# Patient Record
Sex: MALE | Race: WHITE | NOT HISPANIC OR LATINO | Employment: FULL TIME | URBAN - METROPOLITAN AREA
[De-identification: names, ages, dates, MRNs, and addresses within clinical notes are randomized per-mention and may not be internally consistent; named-entity substitution may affect disease eponyms.]

---

## 2023-04-13 ENCOUNTER — OFFICE VISIT (OUTPATIENT)
Dept: PRIMARY CARE | Facility: CLINIC | Age: 68
End: 2023-04-13
Payer: COMMERCIAL

## 2023-04-13 VITALS
HEIGHT: 67 IN | BODY MASS INDEX: 35 KG/M2 | TEMPERATURE: 98.4 F | HEART RATE: 70 BPM | WEIGHT: 223 LBS | DIASTOLIC BLOOD PRESSURE: 84 MMHG | RESPIRATION RATE: 14 BRPM | SYSTOLIC BLOOD PRESSURE: 122 MMHG

## 2023-04-13 DIAGNOSIS — E55.9 VITAMIN D DEFICIENCY, UNSPECIFIED: ICD-10-CM

## 2023-04-13 DIAGNOSIS — R35.1 NOCTURIA: ICD-10-CM

## 2023-04-13 DIAGNOSIS — E78.5 DYSLIPIDEMIA: ICD-10-CM

## 2023-04-13 DIAGNOSIS — I25.10 CORONARY ARTERY DISEASE DUE TO LIPID RICH PLAQUE: ICD-10-CM

## 2023-04-13 DIAGNOSIS — Z95.1 S/P CABG (CORONARY ARTERY BYPASS GRAFT): ICD-10-CM

## 2023-04-13 DIAGNOSIS — M10.9 ACUTE GOUT OF ANKLE, UNSPECIFIED CAUSE, UNSPECIFIED LATERALITY: ICD-10-CM

## 2023-04-13 DIAGNOSIS — I25.83 CORONARY ARTERY DISEASE DUE TO LIPID RICH PLAQUE: ICD-10-CM

## 2023-04-13 DIAGNOSIS — K21.9 GASTROESOPHAGEAL REFLUX DISEASE WITHOUT ESOPHAGITIS: ICD-10-CM

## 2023-04-13 DIAGNOSIS — I10 BENIGN ESSENTIAL HYPERTENSION: Primary | ICD-10-CM

## 2023-04-13 DIAGNOSIS — Z00.00 ROUTINE GENERAL MEDICAL EXAMINATION AT HEALTH CARE FACILITY: ICD-10-CM

## 2023-04-13 DIAGNOSIS — R73.9 HYPERGLYCEMIA: ICD-10-CM

## 2023-04-13 PROCEDURE — 85025 COMPLETE CBC W/AUTO DIFF WBC: CPT | Performed by: INTERNAL MEDICINE

## 2023-04-13 PROCEDURE — 1170F FXNL STATUS ASSESSED: CPT | Performed by: INTERNAL MEDICINE

## 2023-04-13 PROCEDURE — 99214 OFFICE O/P EST MOD 30 MIN: CPT | Performed by: INTERNAL MEDICINE

## 2023-04-13 PROCEDURE — 80061 LIPID PANEL: CPT | Performed by: INTERNAL MEDICINE

## 2023-04-13 PROCEDURE — 3079F DIAST BP 80-89 MM HG: CPT | Performed by: INTERNAL MEDICINE

## 2023-04-13 PROCEDURE — 3074F SYST BP LT 130 MM HG: CPT | Performed by: INTERNAL MEDICINE

## 2023-04-13 PROCEDURE — 1036F TOBACCO NON-USER: CPT | Performed by: INTERNAL MEDICINE

## 2023-04-13 PROCEDURE — 1159F MED LIST DOCD IN RCRD: CPT | Performed by: INTERNAL MEDICINE

## 2023-04-13 PROCEDURE — 1160F RVW MEDS BY RX/DR IN RCRD: CPT | Performed by: INTERNAL MEDICINE

## 2023-04-13 PROCEDURE — 84153 ASSAY OF PSA TOTAL: CPT | Performed by: INTERNAL MEDICINE

## 2023-04-13 PROCEDURE — 80048 BASIC METABOLIC PNL TOTAL CA: CPT | Performed by: INTERNAL MEDICINE

## 2023-04-13 PROCEDURE — 84450 TRANSFERASE (AST) (SGOT): CPT | Performed by: INTERNAL MEDICINE

## 2023-04-13 PROCEDURE — G0438 PPPS, INITIAL VISIT: HCPCS | Performed by: INTERNAL MEDICINE

## 2023-04-13 PROCEDURE — 83036 HEMOGLOBIN GLYCOSYLATED A1C: CPT | Performed by: INTERNAL MEDICINE

## 2023-04-13 RX ORDER — COLCHICINE 0.6 MG/1
1 TABLET ORAL 3 TIMES DAILY
COMMUNITY
Start: 2021-10-05 | End: 2023-04-13 | Stop reason: SDUPTHER

## 2023-04-13 RX ORDER — TRIAMTERENE AND HYDROCHLOROTHIAZIDE 37.5; 25 MG/1; MG/1
1 CAPSULE ORAL
COMMUNITY
Start: 2016-03-14 | End: 2023-04-13 | Stop reason: SDUPTHER

## 2023-04-13 RX ORDER — ALLOPURINOL 100 MG/1
1 TABLET ORAL DAILY
COMMUNITY
Start: 2020-10-10 | End: 2023-10-06

## 2023-04-13 RX ORDER — COLCHICINE 0.6 MG/1
0.6 TABLET ORAL DAILY
Qty: 30 TABLET | Refills: 2 | Status: SHIPPED | OUTPATIENT
Start: 2023-04-13 | End: 2023-05-11

## 2023-04-13 RX ORDER — METOPROLOL TARTRATE 50 MG/1
TABLET ORAL EVERY 12 HOURS
COMMUNITY
Start: 2019-02-28

## 2023-04-13 RX ORDER — TAMSULOSIN HYDROCHLORIDE 0.4 MG/1
1 CAPSULE ORAL DAILY
COMMUNITY
Start: 2019-02-28 | End: 2023-06-21

## 2023-04-13 RX ORDER — METOPROLOL SUCCINATE 50 MG/1
50 TABLET, EXTENDED RELEASE ORAL DAILY
COMMUNITY
End: 2023-11-27

## 2023-04-13 RX ORDER — FLUTICASONE PROPIONATE AND SALMETEROL 250; 50 UG/1; UG/1
POWDER RESPIRATORY (INHALATION) 2 TIMES DAILY
COMMUNITY
Start: 2019-02-28 | End: 2024-01-30 | Stop reason: ALTCHOICE

## 2023-04-13 RX ORDER — VENLAFAXINE HYDROCHLORIDE 37.5 MG/1
1 TABLET, EXTENDED RELEASE ORAL DAILY
COMMUNITY
End: 2024-01-30 | Stop reason: ALTCHOICE

## 2023-04-13 RX ORDER — TRIAMTERENE AND HYDROCHLOROTHIAZIDE 37.5; 25 MG/1; MG/1
1 CAPSULE ORAL
Qty: 90 CAPSULE | Refills: 3 | Status: SHIPPED | OUTPATIENT
Start: 2023-04-13 | End: 2024-01-04

## 2023-04-13 RX ORDER — ATORVASTATIN CALCIUM 40 MG/1
40 TABLET, FILM COATED ORAL NIGHTLY
COMMUNITY
End: 2024-01-30 | Stop reason: SDUPTHER

## 2023-04-13 ASSESSMENT — ACTIVITIES OF DAILY LIVING (ADL)
NEEDS ASSISTANCE WITH FOOD: INDEPENDENT
HEARING - LEFT EAR: FUNCTIONAL
TOILETING: INDEPENDENT
USING TELEPHONE: INDEPENDENT
PREPARING MEALS: INDEPENDENT
WALKS IN HOME: INDEPENDENT
BATHING: INDEPENDENT
GROOMING: INDEPENDENT
HEARING - RIGHT EAR: FUNCTIONAL
PILL BOX USED: NO
FEEDING YOURSELF: INDEPENDENT
TAKING MEDICATION: INDEPENDENT
GROCERY SHOPPING: INDEPENDENT
JUDGMENT_ADEQUATE_SAFELY_COMPLETE_DAILY_ACTIVITIES: YES
PATIENT'S MEMORY ADEQUATE TO SAFELY COMPLETE DAILY ACTIVITIES?: YES
EATING: INDEPENDENT
ADEQUATE_TO_COMPLETE_ADL: YES
STIL DRIVING: YES
DOING HOUSEWORK: INDEPENDENT
MANAGING FINANCES: INDEPENDENT
DRESSING YOURSELF: INDEPENDENT

## 2023-04-13 ASSESSMENT — ANXIETY QUESTIONNAIRES
1. FEELING NERVOUS, ANXIOUS, OR ON EDGE: NOT AT ALL
IF YOU CHECKED OFF ANY PROBLEMS ON THIS QUESTIONNAIRE, HOW DIFFICULT HAVE THESE PROBLEMS MADE IT FOR YOU TO DO YOUR WORK, TAKE CARE OF THINGS AT HOME, OR GET ALONG WITH OTHER PEOPLE: NOT DIFFICULT AT ALL
6. BECOMING EASILY ANNOYED OR IRRITABLE: NOT AT ALL
7. FEELING AFRAID AS IF SOMETHING AWFUL MIGHT HAPPEN: NOT AT ALL
4. TROUBLE RELAXING: NOT AT ALL
GAD7 TOTAL SCORE: 0
2. NOT BEING ABLE TO STOP OR CONTROL WORRYING: NOT AT ALL
5. BEING SO RESTLESS THAT IT IS HARD TO SIT STILL: NOT AT ALL
3. WORRYING TOO MUCH ABOUT DIFFERENT THINGS: NOT AT ALL

## 2023-04-13 ASSESSMENT — PAIN SCALES - GENERAL: PAINLEVEL: 4

## 2023-04-13 ASSESSMENT — ENCOUNTER SYMPTOMS
DEPRESSION: 0
LOSS OF SENSATION IN FEET: 0
OCCASIONAL FEELINGS OF UNSTEADINESS: 0

## 2023-04-13 ASSESSMENT — PATIENT HEALTH QUESTIONNAIRE - PHQ9
1. LITTLE INTEREST OR PLEASURE IN DOING THINGS: NOT AT ALL
SUM OF ALL RESPONSES TO PHQ9 QUESTIONS 1 AND 2: 0
2. FEELING DOWN, DEPRESSED OR HOPELESS: NOT AT ALL

## 2023-04-13 ASSESSMENT — COLUMBIA-SUICIDE SEVERITY RATING SCALE - C-SSRS
2. HAVE YOU ACTUALLY HAD ANY THOUGHTS OF KILLING YOURSELF?: NO
1. IN THE PAST MONTH, HAVE YOU WISHED YOU WERE DEAD OR WISHED YOU COULD GO TO SLEEP AND NOT WAKE UP?: NO
6. HAVE YOU EVER DONE ANYTHING, STARTED TO DO ANYTHING, OR PREPARED TO DO ANYTHING TO END YOUR LIFE?: NO

## 2023-04-13 NOTE — PROGRESS NOTES
"Subjective   Reason for Visit: Bipin Chatman is an 67 y.o. male here for a Medicare Wellness visit.          Reviewed all medications by prescribing practitioner or clinical pharmacist (such as prescriptions, OTCs, herbal therapies and supplements) and documented in the medical record.    HPI    Patient Care Team:  Christiano Lazo MD as PCP - General  Christiano Lazo MD as PCP - [employer] PCP   Patient is a 67-year-old male here for Medicare wellness he lives home with his wife he is self-sufficient still works part-time he denies any history of falling he denies fever chills cough nausea vomiting.    Review of Systems  10 system reviewed pertinent as above  Objective   Vitals:  /84   Pulse 70   Temp 36.9 °C (98.4 °F)   Resp 14   Ht 1.702 m (5' 7\")   Wt 101 kg (223 lb)   BMI 34.93 kg/m²       Physical Exam  HEENT: Atraumatic normocephalic the pupils are equal and round and reactive to light the sclerae nonicteric extraocular motion are intact.  Neck: Is supple without JVD no carotid bruits the trachea is midline there are no masses pulses are equal and bilateral with normal upstroke.  Skin: Normal.  Skin good texture.  Moist.  Good turgor.  No lesions, no rashes.  Lymph: No lymphadenopathy appreciated, no masses, no lesions  Lungs: Are clear to auscultation and percussion, good breath sounds bilaterally, no rhonchi, no wheezing, good diaphragmatic excursion.  Heart: Normal rate and normal rhythm S1, S2, no S3, no gallop, murmur or rub.  Abdomen: Soft, nontender, no organomegaly, good bowel sounds.    Extremities: Full range of motion, good pulses bilateral.  No cyanosis, no clubbing or edema.  Neuro: Cranial nerves II-XII are grossly intact there is no sensory or motor deficits.  Able to move all extremities.  Assessment/Plan     Medicare wellness  Problem List Items Addressed This Visit          Circulatory    Benign essential hypertension - Primary    Relevant Medications    " triamterene-hydrochlorothiazid (Dyazide) 37.5-25 mg capsule    Other Relevant Orders    Basic Metabolic Panel    CAD (coronary artery disease)    Relevant Medications    metoprolol succinate XL (Toprol-XL) 50 mg 24 hr tablet    metoprolol tartrate (Lopressor) 50 mg tablet    Other Relevant Orders    CBC    S/P CABG (coronary artery bypass graft)    Relevant Orders    CBC       Digestive    GERD (gastroesophageal reflux disease)    Relevant Orders    CBC       Musculoskeletal    Acute gout of ankle    Relevant Medications    colchicine 0.6 mg tablet       Endocrine/Metabolic    Vitamin D deficiency, unspecified       Other    Dyslipidemia    Relevant Orders    Lipid Panel    AST     Other Visit Diagnoses       Hyperglycemia        Relevant Orders    Hemoglobin A1C    Nocturia        Relevant Orders    Prostate Specific Antigen

## 2023-04-13 NOTE — PROGRESS NOTES
Subjective   Bipin Chatman is a 67 y.o. male who presents for patient wishes to have blood works complained of muscle aches in addition to Medicare wellness.  HPI  Patient is a 67-year-old male with known history of benign essential hypertension coronary artery disease coronary artery bypass graft gastroesophageal reflux disease history of acute gout chronic gout now vitamin D deficient dyslipidemia patient complaining of bilateral thigh pain severe radiating to his knees having difficulties walking, he noted the pain is worse with his statins.  He denies fever chills cough nausea vomiting  Review of Systems  10 system reviewed with patient pertinent as above  Objective     Visit Vitals  /84   Pulse 70   Temp 36.9 °C (98.4 °F)   Resp 14      Physical Exam    HEENT: Atraumatic normocephalic the pupils are equal and round and reactive to light the sclerae nonicteric extraocular motion are intact.  Neck: Is supple without JVD no carotid bruits the trachea is midline there are no masses pulses are equal and bilateral with normal upstroke.  Skin: Normal.  Skin good texture.  Moist.  Good turgor.  No lesions, no rashes.  Lymph: No lymphadenopathy appreciated, no masses, no lesions  Lungs: Are clear to auscultation and percussion, good breath sounds bilaterally, no rhonchi, no wheezing, good diaphragmatic excursion.  Heart: Normal rate and normal rhythm S1, S2, no S3, no gallop, murmur or rub.  Abdomen: Soft, nontender, no organomegaly, good bowel sounds.    Extremities: Full range of motion, good pulses bilateral.  No cyanosis, no clubbing or edema.  Neuro: Cranial nerves II-XII are grossly intact there is no sensory or motor deficits.  Able to move all extremities.    Assessment/Plan     Bilateral thigh and knee pain  I suspect statin induced myositis  I instructed patient to stop Lipitor for 1 week  And report as to the results of his pain    Continue with the low-fat, low-cholesterol diet,  I recommended  Mediterranean diet, which include fish, chicken, vegetables and olive oil  Exercise daily for 30 minutes at least 3 times a week  Continue home medications    Coronary artery disease  Continue current medications  Hold statins for a week  Beta-blockers  Refills given    Hypertension  No added salt diet, do not and salt to your food  Try to exercise every other day for 30 minutes  Continue current medications      Problem List Items Addressed This Visit          Circulatory    Benign essential hypertension - Primary    Relevant Medications    triamterene-hydrochlorothiazid (Dyazide) 37.5-25 mg capsule    Other Relevant Orders    Basic Metabolic Panel    CAD (coronary artery disease)    Relevant Medications    metoprolol succinate XL (Toprol-XL) 50 mg 24 hr tablet    metoprolol tartrate (Lopressor) 50 mg tablet    Other Relevant Orders    CBC    S/P CABG (coronary artery bypass graft)    Relevant Orders    CBC       Digestive    GERD (gastroesophageal reflux disease)    Relevant Orders    CBC       Musculoskeletal    Acute gout of ankle    Relevant Medications    colchicine 0.6 mg tablet       Endocrine/Metabolic    Vitamin D deficiency, unspecified       Other    Dyslipidemia    Relevant Orders    Lipid Panel    AST     Other Visit Diagnoses       Hyperglycemia        Relevant Orders    Hemoglobin A1C    Nocturia        Relevant Orders    Prostate Specific Antigen              Christiano Lazo MD

## 2023-04-20 ENCOUNTER — TELEPHONE (OUTPATIENT)
Dept: PRIMARY CARE | Facility: CLINIC | Age: 68
End: 2023-04-20
Payer: COMMERCIAL

## 2023-04-20 DIAGNOSIS — E78.5 DYSLIPIDEMIA: Primary | ICD-10-CM

## 2023-04-20 DIAGNOSIS — I25.10 CORONARY ARTERY DISEASE DUE TO LIPID RICH PLAQUE: ICD-10-CM

## 2023-04-20 DIAGNOSIS — I25.83 CORONARY ARTERY DISEASE DUE TO LIPID RICH PLAQUE: ICD-10-CM

## 2023-04-20 NOTE — TELEPHONE ENCOUNTER
Pt states he was told to stop taking atorvastatin for 1 week, he says the inflammation in his knees has eased up but its still there, he is requesting a rx for ozempic

## 2023-05-06 DIAGNOSIS — M10.9 ACUTE GOUT OF ANKLE, UNSPECIFIED CAUSE, UNSPECIFIED LATERALITY: ICD-10-CM

## 2023-05-11 RX ORDER — COLCHICINE 0.6 MG/1
0.6 TABLET ORAL DAILY
Qty: 30 TABLET | Refills: 2 | Status: SHIPPED | OUTPATIENT
Start: 2023-05-11 | End: 2023-07-17

## 2023-06-16 DIAGNOSIS — E88.810 METABOLIC SYNDROME: ICD-10-CM

## 2023-06-16 DIAGNOSIS — N40.0 BENIGN PROSTATIC HYPERPLASIA WITHOUT LOWER URINARY TRACT SYMPTOMS: Primary | ICD-10-CM

## 2023-06-21 RX ORDER — TAMSULOSIN HYDROCHLORIDE 0.4 MG/1
CAPSULE ORAL
Qty: 90 CAPSULE | Refills: 3 | Status: SHIPPED | OUTPATIENT
Start: 2023-06-21 | End: 2024-01-30 | Stop reason: SDUPTHER

## 2023-06-23 RX ORDER — SEMAGLUTIDE 0.68 MG/ML
INJECTION, SOLUTION SUBCUTANEOUS
Qty: 3 ML | Refills: 0 | Status: SHIPPED | OUTPATIENT
Start: 2023-06-23 | End: 2023-09-15

## 2023-07-16 DIAGNOSIS — M10.9 ACUTE GOUT OF ANKLE, UNSPECIFIED CAUSE, UNSPECIFIED LATERALITY: ICD-10-CM

## 2023-07-17 RX ORDER — COLCHICINE 0.6 MG/1
0.6 TABLET ORAL DAILY
Qty: 30 TABLET | Refills: 2 | Status: SHIPPED | OUTPATIENT
Start: 2023-07-17 | End: 2024-04-11 | Stop reason: SDUPTHER

## 2023-08-28 DIAGNOSIS — F41.9 ANXIETY: Primary | ICD-10-CM

## 2023-08-29 RX ORDER — VENLAFAXINE HYDROCHLORIDE 37.5 MG/1
37.5 CAPSULE, EXTENDED RELEASE ORAL DAILY
Qty: 90 CAPSULE | Refills: 3 | Status: SHIPPED | OUTPATIENT
Start: 2023-08-29 | End: 2024-01-30 | Stop reason: ALTCHOICE

## 2023-09-15 RX ORDER — SEMAGLUTIDE 0.68 MG/ML
INJECTION, SOLUTION SUBCUTANEOUS
Qty: 2 ML | Refills: 3 | Status: SHIPPED | OUTPATIENT
Start: 2023-09-15 | End: 2024-01-09 | Stop reason: SDUPTHER

## 2023-11-27 DIAGNOSIS — I25.10 ATHEROSCLEROTIC HEART DISEASE OF NATIVE CORONARY ARTERY WITHOUT ANGINA PECTORIS: ICD-10-CM

## 2023-11-27 RX ORDER — METOPROLOL SUCCINATE 50 MG/1
50 TABLET, EXTENDED RELEASE ORAL DAILY
Qty: 90 TABLET | Refills: 3 | Status: SHIPPED | OUTPATIENT
Start: 2023-11-27 | End: 2024-01-30 | Stop reason: SDUPTHER

## 2024-01-04 DIAGNOSIS — I10 BENIGN ESSENTIAL HYPERTENSION: ICD-10-CM

## 2024-01-04 RX ORDER — TRIAMTERENE AND HYDROCHLOROTHIAZIDE 37.5; 25 MG/1; MG/1
1 CAPSULE ORAL
Qty: 100 CAPSULE | Refills: 2 | Status: SHIPPED | OUTPATIENT
Start: 2024-01-04

## 2024-01-09 RX ORDER — SEMAGLUTIDE 0.68 MG/ML
0.25 INJECTION, SOLUTION SUBCUTANEOUS
Qty: 2 ML | Refills: 3 | Status: SHIPPED | OUTPATIENT
Start: 2024-01-09 | End: 2024-01-30 | Stop reason: SDUPTHER

## 2024-01-25 NOTE — PROGRESS NOTES
Subjective   Bipin Chatman is a 68 y.o. male who is here for follow-up, medication management.    HPI  Patient is a 68-year-old male with known history of benign essential hypertension coronary artery disease coronary artery bypass graft gastroesophageal reflux disease history of gout now vitamin D deficient dyslipidemia patient takes medication prescribed was no major medical denies chest pain shortness of breath fever chills nausea vomiting constipation diarrhea dysuria urgency or frequency.  Patient is here for fasting blood work.  He had questions about his medications.  P  Review of Systems  10 system reviewed with patient pertinent as above  Objective     Visit Vitals  /80   Pulse 78   Temp 36.6 °C (97.9 °F)   Resp 14      Physical Exam    HEENT: Atraumatic normocephalic the pupils are equal and round and reactive to light the sclerae nonicteric extraocular motion are intact.  Neck: Is supple without JVD no carotid bruits the trachea is midline there are no masses pulses are equal and bilateral with normal upstroke.  Skin: Normal.  Skin good texture.  Moist.  Good turgor.  No lesions, no rashes.  Lymph: No lymphadenopathy appreciated, no masses, no lesions  Lungs: Are clear to auscultation and percussion, good breath sounds bilaterally, no rhonchi, no wheezing, good diaphragmatic excursion.  Heart: Normal rate and normal rhythm S1, S2, no S3, no gallop, murmur or rub.  Abdomen: Soft, nontender, no organomegaly, good bowel sounds.    Extremities: Full range of motion, good pulses bilateral.  No cyanosis, no clubbing or edema.  Neuro: Cranial nerves II-XII are grossly intact there is no sensory or motor deficits.  Able to move all extremities.    Assessment/Plan     Here for follow-up and fasting blood work    CBC BMP lipids AST ALT vitamin 25-hydroxy PSA    Prevention  Colonoscopy 04/17/2023 next in 5 years  PSA 01/30/2024    Immunization  Flu vaccine declined  Pneumonia vaccine 01/30/204  Shingles  vaccine declined  RSV  Vaccine declined      Bilateral thigh and knee pain  I suspect statin induced myositis  I instructed patient to stop Lipitor for 1 week  And report as to the results of his pain    Continue with the low-fat, low-cholesterol diet,  I recommended Mediterranean diet, which include fish, chicken, vegetables and olive oil  Exercise daily for 30 minutes at least 3 times a week  Continue home medications    Coronary artery disease  Continue current medications  Hold statins for a week  Beta-blockers  Refills given    Hypertension  No added salt diet, do not and salt to your food  Try to exercise every other day for 30 minutes  Continue current medications      Problem List Items Addressed This Visit       Dyslipidemia - Primary    Relevant Medications    atorvastatin (Lipitor) 40 mg tablet     Other Visit Diagnoses       Anxiety        Relevant Medications    venlafaxine XR (Effexor-XR) 37.5 mg 24 hr capsule    Atherosclerotic heart disease of native coronary artery without angina pectoris        Relevant Medications    metoprolol succinate XL (Toprol-XL) 50 mg 24 hr tablet    BMI 34.0-34.9,adult        Relevant Medications    semaglutide (Ozempic) 0.25 mg or 0.5 mg (2 mg/3 mL) pen injector    tamsulosin (Flomax) 0.4 mg 24 hr capsule    Benign prostatic hyperplasia without lower urinary tract symptoms        Relevant Medications    tamsulosin (Flomax) 0.4 mg 24 hr capsule    Metabolic syndrome        Relevant Medications    tamsulosin (Flomax) 0.4 mg 24 hr capsule            Christiano Lazo MD

## 2024-01-29 RX ORDER — TERBINAFINE HYDROCHLORIDE 250 MG/1
TABLET ORAL
COMMUNITY
Start: 2023-11-09 | End: 2024-01-30 | Stop reason: ALTCHOICE

## 2024-01-29 RX ORDER — METOPROLOL TARTRATE 100 %
POWDER (GRAM) MISCELLANEOUS
COMMUNITY
End: 2024-01-30 | Stop reason: ALTCHOICE

## 2024-01-30 ENCOUNTER — OFFICE VISIT (OUTPATIENT)
Dept: PRIMARY CARE | Facility: CLINIC | Age: 69
End: 2024-01-30
Payer: COMMERCIAL

## 2024-01-30 VITALS
BODY MASS INDEX: 33.9 KG/M2 | TEMPERATURE: 97.9 F | HEIGHT: 67 IN | DIASTOLIC BLOOD PRESSURE: 80 MMHG | SYSTOLIC BLOOD PRESSURE: 122 MMHG | HEART RATE: 78 BPM | RESPIRATION RATE: 14 BRPM | WEIGHT: 216 LBS

## 2024-01-30 DIAGNOSIS — I25.10 ATHEROSCLEROSIS OF NATIVE CORONARY ARTERY OF NATIVE HEART WITHOUT ANGINA PECTORIS: ICD-10-CM

## 2024-01-30 DIAGNOSIS — N40.0 BENIGN PROSTATIC HYPERPLASIA WITHOUT LOWER URINARY TRACT SYMPTOMS: ICD-10-CM

## 2024-01-30 DIAGNOSIS — E78.5 DYSLIPIDEMIA: Primary | ICD-10-CM

## 2024-01-30 DIAGNOSIS — Z92.29: ICD-10-CM

## 2024-01-30 DIAGNOSIS — I25.10 ATHEROSCLEROTIC HEART DISEASE OF NATIVE CORONARY ARTERY WITHOUT ANGINA PECTORIS: ICD-10-CM

## 2024-01-30 DIAGNOSIS — R73.9 HYPERGLYCEMIA: ICD-10-CM

## 2024-01-30 DIAGNOSIS — E88.810 METABOLIC SYNDROME: ICD-10-CM

## 2024-01-30 DIAGNOSIS — E55.9 VITAMIN D INSUFFICIENCY: ICD-10-CM

## 2024-01-30 DIAGNOSIS — F41.9 ANXIETY: ICD-10-CM

## 2024-01-30 PROCEDURE — 3079F DIAST BP 80-89 MM HG: CPT | Performed by: INTERNAL MEDICINE

## 2024-01-30 PROCEDURE — 83036 HEMOGLOBIN GLYCOSYLATED A1C: CPT | Performed by: INTERNAL MEDICINE

## 2024-01-30 PROCEDURE — 85025 COMPLETE CBC W/AUTO DIFF WBC: CPT | Performed by: INTERNAL MEDICINE

## 2024-01-30 PROCEDURE — 80061 LIPID PANEL: CPT | Performed by: INTERNAL MEDICINE

## 2024-01-30 PROCEDURE — 3008F BODY MASS INDEX DOCD: CPT | Performed by: INTERNAL MEDICINE

## 2024-01-30 PROCEDURE — G0009 ADMIN PNEUMOCOCCAL VACCINE: HCPCS | Performed by: INTERNAL MEDICINE

## 2024-01-30 PROCEDURE — 84460 ALANINE AMINO (ALT) (SGPT): CPT | Performed by: INTERNAL MEDICINE

## 2024-01-30 PROCEDURE — 1159F MED LIST DOCD IN RCRD: CPT | Performed by: INTERNAL MEDICINE

## 2024-01-30 PROCEDURE — 1036F TOBACCO NON-USER: CPT | Performed by: INTERNAL MEDICINE

## 2024-01-30 PROCEDURE — 90677 PCV20 VACCINE IM: CPT | Performed by: INTERNAL MEDICINE

## 2024-01-30 PROCEDURE — 82306 VITAMIN D 25 HYDROXY: CPT | Performed by: INTERNAL MEDICINE

## 2024-01-30 PROCEDURE — 3074F SYST BP LT 130 MM HG: CPT | Performed by: INTERNAL MEDICINE

## 2024-01-30 PROCEDURE — 80048 BASIC METABOLIC PNL TOTAL CA: CPT | Performed by: INTERNAL MEDICINE

## 2024-01-30 PROCEDURE — 99214 OFFICE O/P EST MOD 30 MIN: CPT | Performed by: INTERNAL MEDICINE

## 2024-01-30 PROCEDURE — 1126F AMNT PAIN NOTED NONE PRSNT: CPT | Performed by: INTERNAL MEDICINE

## 2024-01-30 PROCEDURE — 84450 TRANSFERASE (AST) (SGOT): CPT | Performed by: INTERNAL MEDICINE

## 2024-01-30 RX ORDER — VENLAFAXINE HYDROCHLORIDE 75 MG/1
75 CAPSULE, EXTENDED RELEASE ORAL DAILY
Qty: 90 CAPSULE | Refills: 3 | Status: SHIPPED | OUTPATIENT
Start: 2024-01-30 | End: 2025-01-29

## 2024-01-30 RX ORDER — METOPROLOL SUCCINATE 50 MG/1
50 TABLET, EXTENDED RELEASE ORAL DAILY
Qty: 90 TABLET | Refills: 3 | Status: SHIPPED | OUTPATIENT
Start: 2024-01-30

## 2024-01-30 RX ORDER — VENLAFAXINE HYDROCHLORIDE 37.5 MG/1
37.5 CAPSULE, EXTENDED RELEASE ORAL DAILY
Qty: 90 CAPSULE | Refills: 3 | Status: SHIPPED | OUTPATIENT
Start: 2024-01-30 | End: 2024-01-30 | Stop reason: SDUPTHER

## 2024-01-30 RX ORDER — TAMSULOSIN HYDROCHLORIDE 0.4 MG/1
0.4 CAPSULE ORAL DAILY
Qty: 90 CAPSULE | Refills: 3 | Status: SHIPPED | OUTPATIENT
Start: 2024-01-30

## 2024-01-30 RX ORDER — SEMAGLUTIDE 0.68 MG/ML
0.25 INJECTION, SOLUTION SUBCUTANEOUS
Qty: 2 ML | Refills: 3 | Status: SHIPPED | OUTPATIENT
Start: 2024-01-30

## 2024-01-30 RX ORDER — ATORVASTATIN CALCIUM 40 MG/1
40 TABLET, FILM COATED ORAL NIGHTLY
Qty: 90 TABLET | Refills: 3 | Status: SHIPPED | OUTPATIENT
Start: 2024-01-30 | End: 2025-01-29

## 2024-01-30 ASSESSMENT — PAIN SCALES - GENERAL: PAINLEVEL: 0-NO PAIN

## 2024-02-02 NOTE — TELEPHONE ENCOUNTER
----- Message from Luz Elena Boles MA sent at 2/2/2024  8:45 AM EST -----    ----- Message -----  From: Christiano Lazo MD  Sent: 1/31/2024   5:18 PM EST  To: Luz Elena Boles MA    Normal hemoglobin monitor platelet count, his LDL is 82, total cholesterol 174 HDL 40, triglycerides a little bit elevated, recommend he cut down his carbohydrate intake.  Vitamin D is 24 take D3 2000 units daily.  A1c 5.3% normal AST ALT his glucose is slightly low at 107 normal BUN and creatinine increase fluid intake continue current medication diet exercise, I recommend weight loss.  Repeat fasting blood work in 6 months.

## 2024-02-02 NOTE — TELEPHONE ENCOUNTER
Results left on pts vm and advised to make a 6 month follow up and will repeat blood work and to be fasting.  Also, advised to call if he has any questions.

## 2024-02-03 DIAGNOSIS — E88.810 DYSMETABOLIC SYNDROME: Primary | ICD-10-CM

## 2024-02-03 DIAGNOSIS — E66.9 OBESITY (BMI 30-39.9): ICD-10-CM

## 2024-02-03 RX ORDER — TIRZEPATIDE 2.5 MG/.5ML
2.5 INJECTION, SOLUTION SUBCUTANEOUS
Qty: 2 ML | Refills: 3 | Status: SHIPPED | OUTPATIENT
Start: 2024-02-03

## 2024-04-08 NOTE — PROGRESS NOTES
Subjective   Bipin Chatman is a 68 y.o. male who is here for follow-up, Joint and muscle pain medication management.    HPI  Patient is a 68-year-old male with known history of benign essential hypertension coronary artery disease coronary artery bypass graft gastroesophageal reflux disease history of vitamin D deficient dyslipidemia past history of gout, patient takes medication prescribed complaining of muscle aches muscle fatigue joint pain discomfort hips knees ankles bilateral for the last 2 to 3 weeks, difficulty walking.  Patient denies fever chills nausea vomiting.    Review of Systems  10 system reviewed with patient pertinent as above  Objective     Visit Vitals  /82   Pulse 78   Temp 36.7 °C (98.1 °F)   Resp 16      Physical Exam    HEENT: Atraumatic normocephalic the pupils are equal and round and reactive to light the sclerae nonicteric extraocular motion are intact.  Neck: Is supple without JVD no carotid bruits the trachea is midline there are no masses pulses are equal and bilateral with normal upstroke.  Skin: Normal.  Skin good texture.  Moist.  Good turgor.  No lesions, no rashes.  Lymph: No lymphadenopathy appreciated, no masses, no lesions  Lungs: Are clear to auscultation and percussion, good breath sounds bilaterally, no rhonchi, no wheezing, good diaphragmatic excursion.  Heart: Normal rate and normal rhythm S1, S2, no S3, no gallop, murmur or rub.  Abdomen: Soft, nontender, no organomegaly, good bowel sounds.    Extremities: Full range of motion, good pulses bilateral.  No cyanosis, no clubbing or edema.  Neuro: Cranial nerves II-XII are grossly intact there is no sensory or motor deficits.  Able to move all extremities.    Assessment/Plan       Joint pain muscle pain hips Knees ankles Feet  Hard to walk stiffness no trauma reported  I suspect possibly statin induced myositis  Hold statins for 2 weeks  While we are working out other options      Will hold statins for one week and  report  Xray Hips Knees  ANA CRISTINA RF ESR  CBC BMP Uric acid CPK    Prevention  Colonoscopy 04/17/2023 next in 5 years  PSA 01/30/2024    Immunization  Flu vaccine declined  Pneumonia vaccine 01/30/204  Shingles vaccine declined  RSV  Vaccine declined    Bilateral thigh and knee pain  I suspect statin induced myositis  I instructed patient to stop Lipitor for 1 week  And report as to the results of his pain    Continue with the low-fat, low-cholesterol diet,  I recommended Mediterranean diet, which include fish, chicken, vegetables and olive oil  Exercise daily for 30 minutes at least 3 times a week  Continue home medications    Coronary artery disease  Continue current medications  Hold statins for a week  Beta-blockers  Refills given    Hypertension  No added salt diet, do not and salt to your food  Try to exercise every other day for 30 minutes  Continue current medications      Problem List Items Addressed This Visit       Benign essential hypertension - Primary    CAD (coronary artery disease)    Relevant Orders    Basic Metabolic Panel    Dyslipidemia    Relevant Orders    Basic Metabolic Panel    Hyperlipidemia    Vitamin D deficiency, unspecified    Myositis of lower extremity    Relevant Orders    Sedimentation Rate    CK     Other Visit Diagnoses       Arthralgia of both knees        Relevant Medications    dexAMETHasone (Decadron) 4 mg tablet    Other Relevant Orders    Sedimentation Rate    CBC    Uric acid    ANA CRISTINA    Rheumatoid factor    XR knees anteroposterior standing bilateral    Bilateral hip pain        Relevant Orders    XR hips bilateral 2 VW w pelvis when performed          Christiano Lazo MD

## 2024-04-10 ENCOUNTER — HOSPITAL ENCOUNTER (OUTPATIENT)
Dept: RADIOLOGY | Facility: CLINIC | Age: 69
Discharge: HOME | End: 2024-04-10
Payer: COMMERCIAL

## 2024-04-10 ENCOUNTER — OFFICE VISIT (OUTPATIENT)
Dept: PRIMARY CARE | Facility: CLINIC | Age: 69
End: 2024-04-10
Payer: COMMERCIAL

## 2024-04-10 VITALS
SYSTOLIC BLOOD PRESSURE: 121 MMHG | HEART RATE: 78 BPM | TEMPERATURE: 98.1 F | WEIGHT: 215 LBS | BODY MASS INDEX: 33.74 KG/M2 | DIASTOLIC BLOOD PRESSURE: 82 MMHG | RESPIRATION RATE: 16 BRPM | HEIGHT: 67 IN

## 2024-04-10 DIAGNOSIS — M25.561 ARTHRALGIA OF BOTH KNEES: ICD-10-CM

## 2024-04-10 DIAGNOSIS — E78.5 DYSLIPIDEMIA: ICD-10-CM

## 2024-04-10 DIAGNOSIS — I25.83 CORONARY ARTERY DISEASE DUE TO LIPID RICH PLAQUE: ICD-10-CM

## 2024-04-10 DIAGNOSIS — I25.10 CORONARY ARTERY DISEASE DUE TO LIPID RICH PLAQUE: ICD-10-CM

## 2024-04-10 DIAGNOSIS — M25.552 BILATERAL HIP PAIN: ICD-10-CM

## 2024-04-10 DIAGNOSIS — M25.551 BILATERAL HIP PAIN: ICD-10-CM

## 2024-04-10 DIAGNOSIS — I10 BENIGN ESSENTIAL HYPERTENSION: Primary | ICD-10-CM

## 2024-04-10 DIAGNOSIS — E55.9 VITAMIN D DEFICIENCY, UNSPECIFIED: ICD-10-CM

## 2024-04-10 DIAGNOSIS — E78.2 MODERATE MIXED HYPERLIPIDEMIA NOT REQUIRING STATIN THERAPY: ICD-10-CM

## 2024-04-10 DIAGNOSIS — M25.562 ARTHRALGIA OF BOTH KNEES: ICD-10-CM

## 2024-04-10 DIAGNOSIS — M60.869 OTHER MYOSITIS OF LOWER EXTREMITY, UNSPECIFIED LATERALITY: ICD-10-CM

## 2024-04-10 PROBLEM — M60.9 MYOSITIS OF LOWER EXTREMITY: Status: ACTIVE | Noted: 2024-04-10

## 2024-04-10 PROBLEM — K21.9 GERD (GASTROESOPHAGEAL REFLUX DISEASE): Status: RESOLVED | Noted: 2023-04-13 | Resolved: 2024-04-10

## 2024-04-10 LAB — ERYTHROCYTE [SEDIMENTATION RATE] IN BLOOD BY WESTERGREN METHOD: 13 MM/H (ref 0–20)

## 2024-04-10 PROCEDURE — 3079F DIAST BP 80-89 MM HG: CPT | Performed by: INTERNAL MEDICINE

## 2024-04-10 PROCEDURE — 82550 ASSAY OF CK (CPK): CPT

## 2024-04-10 PROCEDURE — 85652 RBC SED RATE AUTOMATED: CPT

## 2024-04-10 PROCEDURE — 3074F SYST BP LT 130 MM HG: CPT | Performed by: INTERNAL MEDICINE

## 2024-04-10 PROCEDURE — 73565 X-RAY EXAM OF KNEES: CPT

## 2024-04-10 PROCEDURE — 73521 X-RAY EXAM HIPS BI 2 VIEWS: CPT | Mod: BILATERAL PROCEDURE | Performed by: RADIOLOGY

## 2024-04-10 PROCEDURE — 85025 COMPLETE CBC W/AUTO DIFF WBC: CPT | Performed by: INTERNAL MEDICINE

## 2024-04-10 PROCEDURE — 1126F AMNT PAIN NOTED NONE PRSNT: CPT | Performed by: INTERNAL MEDICINE

## 2024-04-10 PROCEDURE — 84550 ASSAY OF BLOOD/URIC ACID: CPT

## 2024-04-10 PROCEDURE — 80048 BASIC METABOLIC PNL TOTAL CA: CPT | Performed by: INTERNAL MEDICINE

## 2024-04-10 PROCEDURE — 1159F MED LIST DOCD IN RCRD: CPT | Performed by: INTERNAL MEDICINE

## 2024-04-10 PROCEDURE — 86431 RHEUMATOID FACTOR QUANT: CPT

## 2024-04-10 PROCEDURE — 1036F TOBACCO NON-USER: CPT | Performed by: INTERNAL MEDICINE

## 2024-04-10 PROCEDURE — 36415 COLL VENOUS BLD VENIPUNCTURE: CPT

## 2024-04-10 PROCEDURE — 73565 X-RAY EXAM OF KNEES: CPT | Mod: BILATERAL PROCEDURE | Performed by: RADIOLOGY

## 2024-04-10 PROCEDURE — 3008F BODY MASS INDEX DOCD: CPT | Performed by: INTERNAL MEDICINE

## 2024-04-10 PROCEDURE — 99214 OFFICE O/P EST MOD 30 MIN: CPT | Performed by: INTERNAL MEDICINE

## 2024-04-10 PROCEDURE — 73521 X-RAY EXAM HIPS BI 2 VIEWS: CPT

## 2024-04-10 PROCEDURE — 86038 ANTINUCLEAR ANTIBODIES: CPT

## 2024-04-10 RX ORDER — DEXAMETHASONE 4 MG/1
4 TABLET ORAL
Qty: 5 TABLET | Refills: 0 | Status: SHIPPED | OUTPATIENT
Start: 2024-04-10 | End: 2024-04-17 | Stop reason: SDUPTHER

## 2024-04-10 ASSESSMENT — ENCOUNTER SYMPTOMS
OCCASIONAL FEELINGS OF UNSTEADINESS: 0
DEPRESSION: 0
LOSS OF SENSATION IN FEET: 0

## 2024-04-10 ASSESSMENT — PAIN SCALES - GENERAL: PAINLEVEL: 0-NO PAIN

## 2024-04-11 DIAGNOSIS — M25.561 ARTHRALGIA OF BOTH KNEES: ICD-10-CM

## 2024-04-11 DIAGNOSIS — M10.9 ACUTE GOUT OF ANKLE, UNSPECIFIED CAUSE, UNSPECIFIED LATERALITY: ICD-10-CM

## 2024-04-11 DIAGNOSIS — M25.562 ARTHRALGIA OF BOTH KNEES: ICD-10-CM

## 2024-04-11 DIAGNOSIS — M10.9 ACUTE GOUT OF KNEE, UNSPECIFIED CAUSE, UNSPECIFIED LATERALITY: Primary | ICD-10-CM

## 2024-04-11 LAB
ANA SER QL HEP2 SUBST: NEGATIVE
CK SERPL-CCNC: 108 U/L (ref 0–325)
RHEUMATOID FACT SER NEPH-ACNC: 25 IU/ML (ref 0–15)
URATE SERPL-MCNC: 10.2 MG/DL (ref 4–7.5)

## 2024-04-11 RX ORDER — COLCHICINE 0.6 MG/1
0.6 TABLET ORAL DAILY
Qty: 30 TABLET | Refills: 2 | Status: SHIPPED | OUTPATIENT
Start: 2024-04-11

## 2024-04-11 RX ORDER — COLCHICINE 0.6 MG/1
0.6 TABLET ORAL DAILY
Qty: 30 TABLET | Refills: 2 | Status: SHIPPED | OUTPATIENT
Start: 2024-04-11 | End: 2024-04-11 | Stop reason: SDUPTHER

## 2024-04-11 RX ORDER — ALLOPURINOL 300 MG/1
300 TABLET ORAL DAILY
Qty: 30 TABLET | Refills: 11 | Status: SHIPPED | OUTPATIENT
Start: 2024-04-11 | End: 2024-04-11 | Stop reason: SDUPTHER

## 2024-04-11 RX ORDER — ALLOPURINOL 300 MG/1
300 TABLET ORAL DAILY
Qty: 30 TABLET | Refills: 11 | Status: SHIPPED | OUTPATIENT
Start: 2024-04-11 | End: 2025-04-11

## 2024-04-14 DIAGNOSIS — M25.562 ARTHRALGIA OF BOTH KNEES: ICD-10-CM

## 2024-04-14 DIAGNOSIS — M25.561 ARTHRALGIA OF BOTH KNEES: ICD-10-CM

## 2024-04-17 RX ORDER — DEXAMETHASONE 4 MG/1
TABLET ORAL
Qty: 5 TABLET | Refills: 0 | Status: SHIPPED | OUTPATIENT
Start: 2024-04-17

## 2024-04-17 RX ORDER — DEXAMETHASONE 4 MG/1
4 TABLET ORAL
Qty: 5 TABLET | Refills: 0 | Status: SHIPPED | OUTPATIENT
Start: 2024-04-17 | End: 2024-04-22

## 2024-06-04 ENCOUNTER — OFFICE VISIT (OUTPATIENT)
Dept: PODIATRY | Facility: CLINIC | Age: 69
End: 2024-06-04
Payer: COMMERCIAL

## 2024-06-04 DIAGNOSIS — M79.672 PAIN IN BOTH FEET: Primary | ICD-10-CM

## 2024-06-04 DIAGNOSIS — M79.671 PAIN IN BOTH FEET: Primary | ICD-10-CM

## 2024-06-04 DIAGNOSIS — M19.071 ARTHRITIS OF BOTH MIDFEET: ICD-10-CM

## 2024-06-04 DIAGNOSIS — M19.072 ARTHRITIS OF BOTH MIDFEET: ICD-10-CM

## 2024-06-04 DIAGNOSIS — B35.1 ONYCHOMYCOSIS: ICD-10-CM

## 2024-06-04 DIAGNOSIS — M19.079 1ST MTP ARTHRITIS: ICD-10-CM

## 2024-06-04 PROCEDURE — 3008F BODY MASS INDEX DOCD: CPT | Performed by: PODIATRIST

## 2024-06-04 PROCEDURE — 99214 OFFICE O/P EST MOD 30 MIN: CPT | Performed by: PODIATRIST

## 2024-06-04 PROCEDURE — 1159F MED LIST DOCD IN RCRD: CPT | Performed by: PODIATRIST

## 2024-06-04 PROCEDURE — 1036F TOBACCO NON-USER: CPT | Performed by: PODIATRIST

## 2024-06-04 PROCEDURE — 1160F RVW MEDS BY RX/DR IN RCRD: CPT | Performed by: PODIATRIST

## 2024-06-04 RX ORDER — TERBINAFINE HYDROCHLORIDE 250 MG/1
250 TABLET ORAL DAILY
Qty: 30 TABLET | Refills: 2 | Status: SHIPPED | OUTPATIENT
Start: 2024-06-04 | End: 2024-08-27

## 2024-06-04 NOTE — PROGRESS NOTES
This is a 68 y.o. male new patient for foot pain    History of Present Illness:   Patient states they are here for toe pain  Has thick nails  Would like to discuss treatment  Also has foot pain  Pain in shoes  Denies trauma  NO other pedal complaints        Past Medical History  Past Medical History:   Diagnosis Date    Anxiety disorder, unspecified 05/14/2019    Anxiety and depression    Personal history of other diseases of the respiratory system 03/01/2016    History of bronchitis    Personal history of other endocrine, nutritional and metabolic disease     History of hypercholesterolemia    Personal history of other mental and behavioral disorders 01/23/2019    History of anxiety    Personal history of other specified conditions 04/03/2019    History of nocturia       Medications and Allergies have been reviewed.    Review Of Systems:  GENERAL: No weight loss, malaise or fevers.  HEENT: Negative for frequent or significant headaches,   RESPIRATORY: Negative for cough, wheezing or shortness of breath.  CARDIOVASCULAR: Negative for chest pain, leg swelling or palpitations.    Physical Exam:  Patient is a pleasant, cooperative, well developed 68 y.o.  adult male. The patient is alert and oriented to time, place and person.   Patient has normal affect and mood.    Examination of Both Lower Extremities:   Objective:   Vasc: DP and PT pulses are palpable bilateral.  CFT is less than 3 seconds bilateral.  Skin temperature is warm to cool proximal to distal bilateral.      Neuro: Vibratory, light touch and proprioception are intact bilateral.    Derm: Nails 1-5 bilateral are intact. Thick and discolored.  Skin is supple with normal texture and turgor noted.  Webspaces are clean, dry and intact bilateral.  There are no hyperkeratoses, ulcerations, verruca or other lesions noted.      Ortho: Muscle strength is 5/5 for all pedal groups tested.   Decreased 1st MTPJ Rom. Pain to dorsal midfoot. No edema, erythema or  ecchymosis noted.     1. Pain in both feet        2. Arthritis of both midfeet        3. 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   Pt in agreement to plan  All questions answered    Amy Robledo DPM  [phone]  [facility]  Fax: [phone]

## 2024-07-01 ENCOUNTER — APPOINTMENT (OUTPATIENT)
Dept: PODIATRY | Facility: CLINIC | Age: 69
End: 2024-07-01
Payer: COMMERCIAL

## 2024-07-08 ENCOUNTER — TELEMEDICINE (OUTPATIENT)
Dept: PRIMARY CARE | Facility: CLINIC | Age: 69
End: 2024-07-08
Payer: COMMERCIAL

## 2024-07-08 DIAGNOSIS — M25.562 ARTHRALGIA OF BOTH KNEES: ICD-10-CM

## 2024-07-08 DIAGNOSIS — U07.1 COVID-19 VIRUS INFECTION: Primary | ICD-10-CM

## 2024-07-08 DIAGNOSIS — M25.561 ARTHRALGIA OF BOTH KNEES: ICD-10-CM

## 2024-07-08 PROCEDURE — 3008F BODY MASS INDEX DOCD: CPT | Performed by: INTERNAL MEDICINE

## 2024-07-08 PROCEDURE — 99213 OFFICE O/P EST LOW 20 MIN: CPT | Performed by: INTERNAL MEDICINE

## 2024-07-08 RX ORDER — DEXAMETHASONE 4 MG/1
4 TABLET ORAL
Qty: 5 TABLET | Refills: 0 | Status: SHIPPED | OUTPATIENT
Start: 2024-07-08 | End: 2024-07-13

## 2024-07-08 ASSESSMENT — ENCOUNTER SYMPTOMS
SORE THROAT: 1
FEVER: 1
COUGH: 1

## 2024-07-08 NOTE — PROGRESS NOTES
Subjective   Bipin Chatman is a 68 y.o. male who is here for follow-up, tested positive for COVID.     History of present illness:  Patient is a 68-year-old male with known history of benign essential hypertension coronary artery disease coronary artery bypass graft gastroesophageal reflux disease history of vitamin D deficient dyslipidemia past history of gout, patient takes medication patient returned from [place] on antibiotic July 5, 2024 and tested positive for COVID immediately thereafter July 6, patient complaining of sore throat fever chills body aches, is not calling July in 2024 his is not getting better.  Complaining of intermittent fever body aches cough sinus congestion sore throat.  Objective   Virtual visit  There were no vitals taken for this visit.     Physical Exam  Virtual visit  Assessment/Plan     Virtual visit  Tested positive for COVID  On or about July 6, 2024  Symptoms have not improved  Sore throat sinus congestion fever intermittent  Body aches feeling fatigued cough  Patient is a high risk patient coronary artery disease  Will start on Paxlovid  And dexamethasone  Fluids and rest   hold atorvastatin, tamsulosin, colchicine ( not currently taking)  Resume 2 to 3 days after you done with Paxlovid  Patient voiced full understanding will call if not better      Joint pain muscle pain hips Knees ankles Feet  Hard to walk stiffness no trauma reported  I suspect possibly statin induced myositis  Hold statins for 2 weeks  While we are working out other options      Will hold statins for one week and report  Xray Hips Knees  ANA CRISTINA RF ESR  CBC BMP Uric acid CPK    Prevention  Colonoscopy 04/17/2023 next in 5 years  PSA 01/30/2024    Immunization  Flu vaccine declined  Pneumonia vaccine 01/30/204  Shingles vaccine declined  RSV  Vaccine declined    Bilateral thigh and knee pain  I suspect statin induced myositis  I instructed patient to stop Lipitor for 1 week  And report as to the results of his  pain    Continue with the low-fat, low-cholesterol diet,  I recommended Mediterranean diet, which include fish, chicken, vegetables and olive oil  Exercise daily for 30 minutes at least 3 times a week  Continue home medications    Coronary artery disease  Continue current medications  Hold statins for a week  Beta-blockers  Refills given    Hypertension  No added salt diet, do not and salt to your food  Try to exercise every other day for 30 minutes  Continue current medications      Problem List Items Addressed This Visit    None  Visit Diagnoses       COVID-19 virus infection    -  Primary    Relevant Medications    nirmatrelvir-ritonavir (PAXLOVID) 300 mg (150 mg x 2)-100 mg tablet therapy pack    Arthralgia of both knees        Relevant Medications    dexAMETHasone (Decadron) 4 mg tablet          Christiano Lazo MD     Answers submitted by the patient for this visit:  Fever Questionnaire (Submitted on 7/8/2024)  Chief Complaint: Fever  Onset: in the past 7 days  Frequency: 2 to 4 times per day  Progression since onset: gradually improving  Max temp prior to arrival: unmeasured  congestion: Yes  cough: Yes  sore throat: Yes

## 2024-07-10 ENCOUNTER — APPOINTMENT (OUTPATIENT)
Dept: PODIATRY | Facility: CLINIC | Age: 69
End: 2024-07-10
Payer: COMMERCIAL

## 2024-07-12 DIAGNOSIS — M25.562 ARTHRALGIA OF BOTH KNEES: ICD-10-CM

## 2024-07-12 DIAGNOSIS — M25.561 ARTHRALGIA OF BOTH KNEES: ICD-10-CM

## 2024-07-13 RX ORDER — DEXAMETHASONE 4 MG/1
4 TABLET ORAL
Qty: 5 TABLET | Refills: 0 | Status: SHIPPED | OUTPATIENT
Start: 2024-07-13 | End: 2024-07-18

## 2024-07-18 ASSESSMENT — ENCOUNTER SYMPTOMS
POLYDIPSIA: 0
APPETITE CHANGE: 0
WEAKNESS: 0
COLOR CHANGE: 0
CHEST TIGHTNESS: 0
DIFFICULTY URINATING: 0
VOMITING: 0
TREMORS: 0
CONFUSION: 0
SLEEP DISTURBANCE: 0
FREQUENCY: 0
NERVOUS/ANXIOUS: 0
PALPITATIONS: 0
SORE THROAT: 0
FEVER: 0
SHORTNESS OF BREATH: 0
FATIGUE: 0
NUMBNESS: 0
CHOKING: 0
FACIAL SWELLING: 0
MYALGIAS: 0
ARTHRALGIAS: 0
EYE DISCHARGE: 0
NAUSEA: 0
JOINT SWELLING: 0
HEMATURIA: 0
COUGH: 0
DIARRHEA: 0
CONSTIPATION: 0
HEADACHES: 0
DIZZINESS: 0
ANAL BLEEDING: 0
ABDOMINAL DISTENTION: 0
POLYPHAGIA: 0
CHILLS: 0
ABDOMINAL PAIN: 0
WHEEZING: 0
EYE PAIN: 0

## 2024-07-19 ENCOUNTER — OFFICE VISIT (OUTPATIENT)
Dept: PRIMARY CARE | Facility: CLINIC | Age: 69
End: 2024-07-19
Payer: COMMERCIAL

## 2024-07-19 VITALS
HEIGHT: 67 IN | DIASTOLIC BLOOD PRESSURE: 70 MMHG | TEMPERATURE: 97.2 F | WEIGHT: 212 LBS | BODY MASS INDEX: 33.27 KG/M2 | SYSTOLIC BLOOD PRESSURE: 110 MMHG

## 2024-07-19 DIAGNOSIS — U07.1 COVID-19 VIRUS INFECTION: ICD-10-CM

## 2024-07-19 DIAGNOSIS — M79.662 PAIN OF LEFT CALF: Primary | ICD-10-CM

## 2024-07-19 LAB — D DIMER PPP FEU-MCNC: 4440 NG/ML FEU

## 2024-07-19 PROCEDURE — 99214 OFFICE O/P EST MOD 30 MIN: CPT | Performed by: PHYSICIAN ASSISTANT

## 2024-07-19 PROCEDURE — 1160F RVW MEDS BY RX/DR IN RCRD: CPT | Performed by: PHYSICIAN ASSISTANT

## 2024-07-19 PROCEDURE — 3078F DIAST BP <80 MM HG: CPT | Performed by: PHYSICIAN ASSISTANT

## 2024-07-19 PROCEDURE — 85379 FIBRIN DEGRADATION QUANT: CPT

## 2024-07-19 PROCEDURE — 3008F BODY MASS INDEX DOCD: CPT | Performed by: PHYSICIAN ASSISTANT

## 2024-07-19 PROCEDURE — 1036F TOBACCO NON-USER: CPT | Performed by: PHYSICIAN ASSISTANT

## 2024-07-19 PROCEDURE — 36415 COLL VENOUS BLD VENIPUNCTURE: CPT

## 2024-07-19 PROCEDURE — 1125F AMNT PAIN NOTED PAIN PRSNT: CPT | Performed by: PHYSICIAN ASSISTANT

## 2024-07-19 PROCEDURE — 3074F SYST BP LT 130 MM HG: CPT | Performed by: PHYSICIAN ASSISTANT

## 2024-07-19 PROCEDURE — 1159F MED LIST DOCD IN RCRD: CPT | Performed by: PHYSICIAN ASSISTANT

## 2024-07-19 ASSESSMENT — PAIN SCALES - GENERAL: PAINLEVEL: 8

## 2024-07-19 NOTE — PROGRESS NOTES
Subjective   Patient ID: Bipin Chatman is a 68 y.o. male with known history of benign essential hypertension coronary artery disease coronary artery bypass graft gastroesophageal reflux disease history of vitamin D deficient dyslipidemia past history of gout who presents for Leg Pain (Left/Onset:  5 days).    HPI the patient is presented with complaints of left posterior thigh and calf pain which he developed 2 hours after walking 2.5 miles 5 days ago. The pain is constant and worse with standing and walking. It is not improving with Ibuprofen. There is no swelling, redness or warmth. The patient had COVID 19 virus 2 weeks ago. He overall feels better. Has mild sore throat. No SOB, wheezing, chest pain.     Review of Systems   Constitutional:  Negative for appetite change, chills, fatigue and fever.   HENT:  Negative for congestion, ear pain, facial swelling, hearing loss, nosebleeds and sore throat.    Eyes:  Negative for pain, discharge and visual disturbance.   Respiratory:  Negative for cough, choking, chest tightness, shortness of breath and wheezing.    Cardiovascular:  Negative for chest pain, palpitations and leg swelling.   Gastrointestinal:  Negative for abdominal distention, abdominal pain, anal bleeding, constipation, diarrhea, nausea and vomiting.   Endocrine: Negative for cold intolerance, heat intolerance, polydipsia, polyphagia and polyuria.   Genitourinary:  Negative for difficulty urinating, frequency, hematuria and urgency.   Musculoskeletal:  Negative for arthralgias, gait problem, joint swelling and myalgias.        Left calf and posterior thigh pain   Skin:  Negative for color change and rash.   Neurological:  Negative for dizziness, tremors, syncope, weakness, numbness and headaches.   Psychiatric/Behavioral:  Negative for behavioral problems, confusion, sleep disturbance and suicidal ideas. The patient is not nervous/anxious.        Objective   /70   Temp 36.2 °C (97.2 °F)  "(Temporal)   Ht 1.702 m (5' 7\")   Wt 96.2 kg (212 lb)   BMI 33.20 kg/m²     Physical Exam  Constitutional:       General: He is not in acute distress.     Appearance: Normal appearance.   HENT:      Head: Normocephalic and atraumatic.      Nose: Nose normal.   Eyes:      Extraocular Movements: Extraocular movements intact.      Conjunctiva/sclera: Conjunctivae normal.      Pupils: Pupils are equal, round, and reactive to light.   Cardiovascular:      Rate and Rhythm: Normal rate and regular rhythm.      Pulses: Normal pulses.      Heart sounds: Normal heart sounds.   Pulmonary:      Effort: Pulmonary effort is normal.      Breath sounds: Normal breath sounds.   Abdominal:      General: Bowel sounds are normal.      Palpations: Abdomen is soft.   Musculoskeletal:         General: Normal range of motion.      Cervical back: Normal range of motion and neck supple.      Comments: Tenderness over left calf. No swelling and erythema noted    Neurological:      General: No focal deficit present.      Mental Status: He is alert and oriented to person, place, and time.   Psychiatric:         Mood and Affect: Mood normal.         Behavior: Behavior normal.         Thought Content: Thought content normal.         Judgment: Judgment normal.         Assessment/Plan     left posterior thigh and calf pain   Need to rule out DVT, duplex US is given to the patient  D-dimer stat is obtained today  Take Eliquis 5 mg bid until DVT is ruled out, samples are given  Elevate feet    COVID-19 virus 2 weeks ago  Recovering  Stable    I will call with results as soon as I get them         "

## 2024-07-23 ENCOUNTER — ANCILLARY PROCEDURE (OUTPATIENT)
Dept: VASCULAR MEDICINE | Facility: CLINIC | Age: 69
End: 2024-07-23
Payer: COMMERCIAL

## 2024-07-23 ENCOUNTER — TELEPHONE (OUTPATIENT)
Dept: PRIMARY CARE | Facility: CLINIC | Age: 69
End: 2024-07-23

## 2024-07-23 DIAGNOSIS — I72.4 ANEURYSM OF LEFT POPLITEAL ARTERY (CMS-HCC): Primary | ICD-10-CM

## 2024-07-23 DIAGNOSIS — M79.662 PAIN OF LEFT CALF: ICD-10-CM

## 2024-07-23 PROCEDURE — 93971 EXTREMITY STUDY: CPT | Performed by: SURGERY

## 2024-07-23 PROCEDURE — 93971 EXTREMITY STUDY: CPT

## 2024-08-19 ENCOUNTER — OFFICE VISIT (OUTPATIENT)
Dept: VASCULAR SURGERY | Facility: HOSPITAL | Age: 69
End: 2024-08-19
Payer: COMMERCIAL

## 2024-08-19 VITALS
HEART RATE: 67 BPM | WEIGHT: 219 LBS | BODY MASS INDEX: 34.3 KG/M2 | OXYGEN SATURATION: 92 % | DIASTOLIC BLOOD PRESSURE: 82 MMHG | SYSTOLIC BLOOD PRESSURE: 134 MMHG

## 2024-08-19 DIAGNOSIS — I73.9 CLAUDICATION, INTERMITTENT (CMS-HCC): Primary | ICD-10-CM

## 2024-08-19 DIAGNOSIS — I72.4 ANEURYSM OF LEFT POPLITEAL ARTERY (CMS-HCC): ICD-10-CM

## 2024-08-19 DIAGNOSIS — Z01.818 PRE-OP EVALUATION: ICD-10-CM

## 2024-08-19 DIAGNOSIS — M10.072 ACUTE IDIOPATHIC GOUT OF LEFT FOOT: ICD-10-CM

## 2024-08-19 PROCEDURE — 99203 OFFICE O/P NEW LOW 30 MIN: CPT | Performed by: NURSE PRACTITIONER

## 2024-08-19 PROCEDURE — 99213 OFFICE O/P EST LOW 20 MIN: CPT | Performed by: NURSE PRACTITIONER

## 2024-08-19 PROCEDURE — 1159F MED LIST DOCD IN RCRD: CPT | Performed by: NURSE PRACTITIONER

## 2024-08-19 PROCEDURE — 3079F DIAST BP 80-89 MM HG: CPT | Performed by: NURSE PRACTITIONER

## 2024-08-19 PROCEDURE — 1125F AMNT PAIN NOTED PAIN PRSNT: CPT | Performed by: NURSE PRACTITIONER

## 2024-08-19 PROCEDURE — 3074F SYST BP LT 130 MM HG: CPT | Performed by: NURSE PRACTITIONER

## 2024-08-19 ASSESSMENT — ENCOUNTER SYMPTOMS
OCCASIONAL FEELINGS OF UNSTEADINESS: 0
DEPRESSION: 0
LOSS OF SENSATION IN FEET: 1

## 2024-08-19 ASSESSMENT — PAIN SCALES - GENERAL: PAINLEVEL: 6

## 2024-08-19 NOTE — PROGRESS NOTES
Chief Complaint:   Bipin Chatman is a 68 y.o. year old man here for evaluation of left leg pain, claudication and new finding of left popliteal aneurysm    HPI  Mr. Chatman is a 68 year old man with Hx of HTN, CAD S/P CABG, GERD, gout, who presents with 4 month Hx of intermittent bilateral leg claudication LLE is worst. Describes pain and burning sensation in thighs and calf, after a trip to [place] and covid infection. He was seen by his PCP and had venous duplex completed that was negative for DVT but noted left 1.7 cm popliteal artery aneurysm that appears thrombosed. His left foot is painful, red and swollen at first metatarsal region, suspicious for gout. He denies rest pain or recent open sores. No numbness or tingling.     Review of Systems  All other systems reviewed and negative other than what is already stated in this note.      Past Medical History:   Diagnosis Date    Anxiety disorder, unspecified 05/14/2019    Anxiety and depression    Personal history of other diseases of the respiratory system 03/01/2016    History of bronchitis    Personal history of other endocrine, nutritional and metabolic disease     History of hypercholesterolemia    Personal history of other mental and behavioral disorders 01/23/2019    History of anxiety    Personal history of other specified conditions 04/03/2019    History of nocturia       Patient Active Problem List   Diagnosis    Benign essential hypertension    CAD (coronary artery disease)    Dyslipidemia    Hyperlipidemia    S/P CABG (coronary artery bypass graft)    Vitamin D deficiency, unspecified    Acute gout of ankle    Myositis of lower extremity    Arthralgia of both knees    COVID-19 virus infection    Pain of left calf    Aneurysm of left popliteal artery (CMS-HCC)    Claudication, intermittent (CMS-[facility])       Past Surgical History:   Procedure Laterality Date    KNEE ARTHROSCOPY W/ DEBRIDEMENT  07/31/2013    Arthroscopy Knee Right    OTHER SURGICAL HISTORY   03/20/2019    Coronary artery bypass graft       No family history on file.    Social History     Tobacco Use    Smoking status: Former     Average packs/day: 1 pack/day for 40.0 years (40.0 ttl pk-yrs)     Types: Cigarettes     Start date: 1980     Passive exposure: Never    Smokeless tobacco: Never   Vaping Use    Vaping status: Never Used   Substance Use Topics    Alcohol use: Yes     Comment: whisky    Drug use: Never         Current Outpatient Medications:     allopurinol (Zyloprim) 300 mg tablet, Take 1 tablet (300 mg) by mouth once daily. (Patient taking differently: Take 1 tablet (300 mg) by mouth if needed.), Disp: 30 tablet, Rfl: 11    apixaban (Eliquis) 5 mg tablet, Take 1 tablet (5 mg) by mouth 2 times a day., Disp: 60 tablet, Rfl: 1    atorvastatin (Lipitor) 40 mg tablet, Take 1 tablet (40 mg) by mouth once daily at bedtime., Disp: 90 tablet, Rfl: 3    colchicine 0.6 mg tablet, Take 1 tablet (0.6 mg) by mouth once daily., Disp: 30 tablet, Rfl: 2    metoprolol succinate XL (Toprol-XL) 50 mg 24 hr tablet, Take 1 tablet (50 mg) by mouth once daily., Disp: 90 tablet, Rfl: 3    tamsulosin (Flomax) 0.4 mg 24 hr capsule, Take 1 capsule (0.4 mg) by mouth once daily., Disp: 90 capsule, Rfl: 3    triamterene-hydrochlorothiazid (Dyazide) 37.5-25 mg capsule, TAKE 1 CAPSULE BY MOUTH ONCE  DAILY, Disp: 100 capsule, Rfl: 2    venlafaxine XR (Effexor-XR) 75 mg 24 hr capsule, Take 1 capsule (75 mg) by mouth once daily., Disp: 90 capsule, Rfl: 3    aspirin 81 MG oral suspension, Aspirin, Disp: , Rfl:     dexAMETHasone (Decadron) 4 mg tablet, TAKE 1 TABLET (4 MG) BY MOUTH ONCE DAILY WITH BREAKFAST FOR 5 DAYS., Disp: 5 tablet, Rfl: 0    terbinafine (LamISIL) 250 mg tablet, Take 1 tablet (250 mg) by mouth once daily. (Patient not taking: Reported on 8/19/2024), Disp: 30 tablet, Rfl: 2        Objective   Vitals:    08/19/24 1432   BP: 134/82   Pulse:    SpO2:      Exam  no acute distress, well developed man appearing his   "stated age  normal sclera  moist mucus membranes  no peripheral edema   symmetric chest rise  nondistended abdomen  alert and oriented, full strength in all extremities, normal sensation to light touch throughout, normal mood  Extremities: Bilateral feet warm brisk capillary refill, no edema, DP/PT monophasic doppler signals bilateral. Left great toe joint appears red, is warm and swollen. Full strength and sensation. No palpable mass in posterior popliteal fossa.     Vascular US Lower Extremity Venous Duplex Left    Height: 1.702 m (5' 7\")   Weight: 96.2 kg (212 lb)   Blood Pressure: Not recorded    Date of Study: 7/23/24   Ordering Provider: Minh Ibarra PA-C   Clinical Indications: left calf pain       Reading Physicians  Performing Staff    Samara Wong MD    Tech: Stormy Phillips,         Indications  Priority: STAT  left calf pain   Dx: Pain of left calf [M79.662 (ICD-10-CM)]     PACS Images     Show images for Vascular US Lower Extremity Venous Duplex Left  Interpretation Summary  Show Result Comparison           [id number] [place] [address]             Tel [phone]        Vascular Lab Report  [facility] US LOWER EXTREMITY VENOUS DUPLEX LEFT        Patient Name:      HEIDI COMER        Reading Physician:  [id number] Samara Wong MD  Study Date:        7/23/2024             Ordering Physician: [id number] MINH IBARRA  MRN/PID:           [id number]              Technologist:       Stormy Phillips                                                               T  Accession#:        [id number]          Technologist 2:  Date of Birth/Age: 1955 / 68 years Encounter#:         [id number]  Gender:            M  Admission Status:  Outpatient            Location Performed: [place]                                                          "      [facility]        Diagnosis/ICD: Pain in left lower leg-M79.662        **CRITICAL RESULT**  Critical Result: There is an aneurysm noted in the left popliteal artery measuring 1.7 cm. Left popliteal artery aneurysm appears to be occluded. Left SFA appears to be occluded.  Notification called to Ruy Vazquez and Christiano Dexter on 7/23/2024 at 11:00:00 AM by Jacqueline Burrows T.     CONCLUSIONS:  Left Lower Venous Insufficiency: There is reflux noted in the popliteal vein.  Right Lower Venous: The right common femoral vein demonstrates normal spontaneous and respirophasic flow.  Left Lower Venous: No evidence of acute deep vein thrombus visualized in the left lower extremity. Additional Findings; Left Popliteal Artery: There is an aneurysm noted in the left popliteal artery measuring 1.7 cm. Left popliteal artery aneurysm appears to be occluded. Left SFA appears to be occluded. Clinical correlation is advised.     Imaging & Doppler Findings:     Left             Time  Popliteal Vein 2.30 sec        Left                  Compress Thrombus        Flow  Distal External Iliac   Yes      None   Spontaneous/Phasic  CFV                     Yes      None   Spontaneous/Phasic  PFV                     Yes      None  FV Proximal             Yes      None   Spontaneous/Phasic  FV Mid                  Yes      None  FV Distal               Yes      None  Popliteal               Yes      None         Reflux  Peroneal                Yes      None  PTV                     Yes      None     Right                Left   PSV                 PSV        Popliteal Mid 0 cm/s                      Lt Diameter  Popliteal Mid   1.7 cm        [id number] Samara Wong MD  Electronically signed by [id number] Samara Wong MD on 7/29/2024 at 10:20:08 PM          Assessment/Plan   The primary encounter diagnosis was Claudication, intermittent (CMS-[facility]). Diagnoses of Aneurysm of left popliteal artery (CMS-[facility]) and Pre-op evaluation were  also pertinent to this visit.  Mr. Chatman is a 68 year old man with Hx of HTN, CAD S/P CABG, GERD, gout, who presents with 4 month Hx of intermittent bilateral leg claudication LLE is worst. Describes pain and burning sensation in thighs and calf, after a trip to [place] and covid infection. He was seen by his PCP who started Eliquis and had venous duplex completed that was negative for DVT but noted left 1.7 cm popliteal artery aneurysm that appears thrombosed, I personally reviewed. On further discussion he confirms bilateral legs claudicate with walking short distances for a while but since April has gotten worse. He does appear to have gout in left great toe joint as well, taking PCP prescribed allopurinol without much effect. Patient seen and discussed with Dr. Bales.  Plan:  - PVR/ALVARO  - CTA aorta bilateral iliofemoral with run off  - vein mapping  - may continue Eliquis as determined by PCP  - follow up in clinic with Dr. Bales to discuss diagnostic imaging results.  - continue gout treatment per PCP    Blank Cunningham, APRN-CNP     This note was created in part after personal review of documents in EMR including recent labs and available radiologic imaging. Total time spent in review of EMR, relevant imaging, time with patient and completion of this document is 30 minutes.

## 2024-08-20 PROBLEM — I72.4: Status: ACTIVE | Noted: 2024-08-20

## 2024-08-20 PROBLEM — I73.9 CLAUDICATION, INTERMITTENT (CMS-HCC): Status: ACTIVE | Noted: 2024-08-20

## 2024-08-20 PROBLEM — M10.072 ACUTE IDIOPATHIC GOUT OF LEFT FOOT: Status: ACTIVE | Noted: 2024-08-20

## 2024-08-21 DIAGNOSIS — M10.072 ACUTE IDIOPATHIC GOUT OF LEFT FOOT: Primary | ICD-10-CM

## 2024-08-21 RX ORDER — METHYLPREDNISOLONE 4 MG/1
TABLET ORAL
Qty: 21 TABLET | Refills: 0 | Status: SHIPPED | OUTPATIENT
Start: 2024-08-21 | End: 2024-08-22 | Stop reason: SDUPTHER

## 2024-08-22 RX ORDER — PREDNISONE 20 MG/1
TABLET ORAL
Qty: 20 TABLET | Refills: 0 | Status: SHIPPED | OUTPATIENT
Start: 2024-08-22 | End: 2024-09-03

## 2024-08-22 RX ORDER — METHYLPREDNISOLONE 4 MG/1
TABLET ORAL
Qty: 21 TABLET | Refills: 0 | Status: SHIPPED | OUTPATIENT
Start: 2024-08-22 | End: 2024-08-22 | Stop reason: WASHOUT

## 2024-08-22 RX ORDER — METHYLPREDNISOLONE 4 MG/1
TABLET ORAL
Qty: 21 TABLET | Refills: 0 | Status: SHIPPED | OUTPATIENT
Start: 2024-08-22 | End: 2024-08-22 | Stop reason: SDUPTHER

## 2024-08-27 NOTE — PROGRESS NOTES
Subjective   Bipin Chatman is a 68 y.o. male who is here for follow-up, acute gout severe  gout not responding to allopurinol or colchicine .    HPI  Patient is a 68-year-old male with known history of benign essential hypertension coronary artery disease coronary artery bypass graft gastroesophageal reflux disease history of vitamin D deficient dyslipidemia past history of gout, patient takes medication prescribed complaining of muscle aches muscle fatigue joint pain discomfort hips knees ankles bilateral for the last 2 to 3 weeks, difficulty walking.  Patient denies fever chills nausea vomiting.  Treated for acute gout with Colchicine allopurinol, needed prednisone taper.   Review of Systems  10 system reviewed with patient pertinent as above  Objective     Visit Vitals  /82   Pulse 78   Temp 36.6 °C (97.9 °F)   Resp 16        Physical Exam    HEENT: Atraumatic normocephalic the pupils are equal and round and reactive to light the sclerae nonicteric extraocular motion are intact.  Neck: Is supple without JVD no carotid bruits the trachea is midline there are no masses pulses are equal and bilateral with normal upstroke.  Skin: Normal.  Skin good texture.  Moist.  Good turgor.  No lesions, no rashes.  Lymph: No lymphadenopathy appreciated, no masses, no lesions  Lungs: Are clear to auscultation and percussion, good breath sounds bilaterally, no rhonchi, no wheezing, good diaphragmatic excursion.  Heart: Normal rate and normal rhythm S1, S2, no S3, no gallop, murmur or rub.  Abdomen: Soft, nontender, no organomegaly, good bowel sounds.    Extremities: Full range of motion, good pulses bilateral.  No cyanosis, no clubbing or edema.  Neuro: Cranial nerves II-XII are grossly intact there is no sensory or motor deficits.  Able to move all extremities.    Left foot Hot swollen painful Great toe and across the base of 4,3,2 toes    Assessment/Plan     Acute gout status post steroid taper  Failed  colchicine  Purine free diet    Joint pain muscle pain hips Knees ankles Feet  Hard to walk stiffness no trauma reported  I suspect possibly statin induced myositis  Hold statins for 2 weeks  While we are working out other options    Elevated BMI 33.67 kg per square  Low-fat, low-cholesterol diet, exercise, daily  Ideal BMI is between 23 and 26 kg/m²  Low carbohydrate diet.  Consult regarding weight loss    Prevention  Colonoscopy 04/17/2023 next in 5 years  PSA 01/30/2024    Immunization  Flu vaccine declined  Pneumonia vaccine 01/30/204  Shingles vaccine declined  RSV  Vaccine declined    Bilateral thigh and knee pain  I suspect statin induced myositis  I instructed patient to stop Lipitor for 1 week  And report as to the results of his pain    Continue with the low-fat, low-cholesterol diet,  I recommended Mediterranean diet, which include fish, chicken, vegetables and olive oil  Exercise daily for 30 minutes at least 3 times a week  Continue home medications    Coronary artery disease  Continue current medications  Hold statins for a week  Beta-blockers  Refills given    Hypertension  No added salt diet, do not and salt to your food  Try to exercise every other day for 30 minutes  Continue current medications      Problem List Items Addressed This Visit    None      Christiano Lazo MD

## 2024-08-29 ENCOUNTER — APPOINTMENT (OUTPATIENT)
Dept: PRIMARY CARE | Facility: CLINIC | Age: 69
End: 2024-08-29
Payer: COMMERCIAL

## 2024-08-29 VITALS
BODY MASS INDEX: 33.74 KG/M2 | HEART RATE: 78 BPM | DIASTOLIC BLOOD PRESSURE: 82 MMHG | RESPIRATION RATE: 16 BRPM | TEMPERATURE: 97.9 F | WEIGHT: 215 LBS | SYSTOLIC BLOOD PRESSURE: 136 MMHG | HEIGHT: 67 IN

## 2024-08-29 DIAGNOSIS — M10.072 ACUTE IDIOPATHIC GOUT OF LEFT FOOT: Primary | ICD-10-CM

## 2024-08-29 LAB — URATE SERPL-MCNC: 6.3 MG/DL (ref 4–7.5)

## 2024-08-29 PROCEDURE — 84550 ASSAY OF BLOOD/URIC ACID: CPT

## 2024-08-29 RX ORDER — FEBUXOSTAT 80 MG/1
80 TABLET, FILM COATED ORAL DAILY
Qty: 30 TABLET | Refills: 2 | Status: SHIPPED | OUTPATIENT
Start: 2024-08-29 | End: 2024-08-29 | Stop reason: SDUPTHER

## 2024-08-29 RX ORDER — FEBUXOSTAT 80 MG/1
80 TABLET, FILM COATED ORAL DAILY
Qty: 30 TABLET | Refills: 2 | Status: SHIPPED | OUTPATIENT
Start: 2024-08-29

## 2024-08-29 ASSESSMENT — ENCOUNTER SYMPTOMS
LOSS OF SENSATION IN FEET: 0
OCCASIONAL FEELINGS OF UNSTEADINESS: 0
DEPRESSION: 0

## 2024-08-29 ASSESSMENT — ACTIVITIES OF DAILY LIVING (ADL)
WALKS IN HOME: INDEPENDENT
PILL BOX USED: NO
EATING: INDEPENDENT
PATIENT'S MEMORY ADEQUATE TO SAFELY COMPLETE DAILY ACTIVITIES?: YES
TAKING MEDICATION: INDEPENDENT
JUDGMENT_ADEQUATE_SAFELY_COMPLETE_DAILY_ACTIVITIES: YES
GROCERY SHOPPING: INDEPENDENT
FEEDING YOURSELF: INDEPENDENT
HEARING - LEFT EAR: FUNCTIONAL
GROOMING: INDEPENDENT
TOILETING: INDEPENDENT
ADEQUATE_TO_COMPLETE_ADL: YES
NEEDS ASSISTANCE WITH FOOD: INDEPENDENT
USING TRANSPORTATION: INDEPENDENT
USING TELEPHONE: INDEPENDENT
DRESSING YOURSELF: INDEPENDENT
BATHING: INDEPENDENT
MANAGING FINANCES: INDEPENDENT
DOING HOUSEWORK: INDEPENDENT
HEARING - RIGHT EAR: FUNCTIONAL
STIL DRIVING: YES
PREPARING MEALS: INDEPENDENT

## 2024-08-29 ASSESSMENT — COLUMBIA-SUICIDE SEVERITY RATING SCALE - C-SSRS
2. HAVE YOU ACTUALLY HAD ANY THOUGHTS OF KILLING YOURSELF?: NO
6. HAVE YOU EVER DONE ANYTHING, STARTED TO DO ANYTHING, OR PREPARED TO DO ANYTHING TO END YOUR LIFE?: NO
1. IN THE PAST MONTH, HAVE YOU WISHED YOU WERE DEAD OR WISHED YOU COULD GO TO SLEEP AND NOT WAKE UP?: NO

## 2024-08-29 ASSESSMENT — GERIATRIC MINI NUTRITIONAL ASSESSMENT (MNA)
E NEUROPSYCHOLOGICAL PROBLEMS: NO PSYCHOLOGICAL PROBLEMS
B WEIGHT LOSS DURING THE LAST 3 MONTHS: NO WEIGHT LOSS
D HAS SUFFERED PSYCHOLOGICAL STRESS OR ACUTE DISEASE IN THE PAST 3 MONTHS?: NO
A HAS FOOD INTAKE DECLINED OVER THE PAST 3 MONTHS DUE TO LOSS OF APPETITE, DIGESTIVE PROBLEMS, CHEWING OR SWALLOWING DIFFICULTIES?: NO DECREASE IN FOOD INTAKE
C GENERAL MOBILITY: GOES OUT

## 2024-08-29 ASSESSMENT — PAIN SCALES - GENERAL: PAINLEVEL: 6

## 2024-08-29 ASSESSMENT — ANXIETY QUESTIONNAIRES
1. FEELING NERVOUS, ANXIOUS, OR ON EDGE: NOT AT ALL
GAD7 TOTAL SCORE: 0
3. WORRYING TOO MUCH ABOUT DIFFERENT THINGS: NOT AT ALL
4. TROUBLE RELAXING: NOT AT ALL
7. FEELING AFRAID AS IF SOMETHING AWFUL MIGHT HAPPEN: NOT AT ALL
5. BEING SO RESTLESS THAT IT IS HARD TO SIT STILL: NOT AT ALL
2. NOT BEING ABLE TO STOP OR CONTROL WORRYING: NOT AT ALL
6. BECOMING EASILY ANNOYED OR IRRITABLE: NOT AT ALL
IF YOU CHECKED OFF ANY PROBLEMS ON THIS QUESTIONNAIRE, HOW DIFFICULT HAVE THESE PROBLEMS MADE IT FOR YOU TO DO YOUR WORK, TAKE CARE OF THINGS AT HOME, OR GET ALONG WITH OTHER PEOPLE: NOT DIFFICULT AT ALL

## 2024-08-29 ASSESSMENT — PATIENT HEALTH QUESTIONNAIRE - PHQ9
SUM OF ALL RESPONSES TO PHQ9 QUESTIONS 1 AND 2: 0
1. LITTLE INTEREST OR PLEASURE IN DOING THINGS: NOT AT ALL
2. FEELING DOWN, DEPRESSED OR HOPELESS: NOT AT ALL

## 2024-08-29 NOTE — PROGRESS NOTES
"Subjective   Reason for Visit: Bipin Chatman is an 68 y.o. male here for a Medicare Wellness visit.        HPI  Bipin is a 68 male here for Medicare wellness with no major complaints he lives at home independently with his wife reports no falling.  Patient Care Team:  Christiano Lazo MD as PCP - General  Christiano Lazo MD as PCP - [employer] PCP  Christiano Lazo MD     Review of Systems  10 system) as above  Objective   Vitals:  /82   Pulse 78   Temp 36.6 °C (97.9 °F)   Resp 16   Ht 1.702 m (5' 7\")   Wt 97.5 kg (215 lb)   BMI 33.67 kg/m²       Physical Exam  HEENT: Atraumatic normocephalic the pupils are equal and round and reactive to light the sclerae nonicteric extraocular motion are intact.  Neck: Is supple without JVD no carotid bruits the trachea is midline there are no masses pulses are equal and bilateral with normal upstroke.  Skin: Normal.  Skin good texture.  Moist.  Good turgor.  No lesions, no rashes.  Lymph: No lymphadenopathy appreciated, no masses, no lesions  Lungs: Are clear to auscultation and percussion, good breath sounds bilaterally, no rhonchi, no wheezing, good diaphragmatic excursion.  Heart: Normal rate and normal rhythm S1, S2, no S3, no gallop, murmur or rub.  Abdomen: Soft, nontender, no organomegaly, good bowel sounds.    Extremities: Full range of motion, good pulses bilateral.  No cyanosis, no clubbing or edema.  Neuro: Cranial nerves II-XII are grossly intact there is no sensory or motor deficits.  Able to move all extremities.  Assessment/Plan   Medicare wellness  Problem List Items Addressed This Visit             ICD-10-CM    Acute idiopathic gout of left foot - Primary M10.072    Relevant Medications    febuxostat (Uloric) 80 mg tablet    Other Relevant Orders    Uric acid    Referral to Rheumatology    Basic Metabolic Panel            "

## 2024-08-30 LAB
ANION GAP SERPL CALC-SCNC: 16 MMOL/L (ref 10–20)
BUN SERPL-MCNC: 27 MG/DL (ref 7–18)
CALCIUM SERPL-MCNC: 8.6 MG/DL (ref 8.5–10.1)
CHLORIDE SERPL-SCNC: 102 MMOL/L (ref 98–107)
CO2 SERPL-SCNC: 23 MMOL/L (ref 21–32)
CREAT SERPL-MCNC: 1.05 MG/DL (ref 0.6–1.1)
EGFRCR SERPLBLD CKD-EPI 2021: 77 ML/MIN/1.73M*2
GLUCOSE SERPL-MCNC: 125 MG/DL (ref 74–100)
POTASSIUM SERPL-SCNC: 3.7 MMOL/L (ref 3.5–5.1)
SODIUM SERPL-SCNC: 137 MMOL/L (ref 136–145)

## 2024-09-02 DIAGNOSIS — M10.9 ACUTE GOUT OF ANKLE, UNSPECIFIED CAUSE, UNSPECIFIED LATERALITY: ICD-10-CM

## 2024-09-03 RX ORDER — COLCHICINE 0.6 MG/1
0.6 TABLET ORAL DAILY
Qty: 90 TABLET | Refills: 0 | Status: SHIPPED | OUTPATIENT
Start: 2024-09-03

## 2024-09-11 ENCOUNTER — HOSPITAL ENCOUNTER (OUTPATIENT)
Dept: RADIOLOGY | Facility: HOSPITAL | Age: 69
Discharge: HOME | End: 2024-09-11
Payer: COMMERCIAL

## 2024-09-11 ENCOUNTER — HOSPITAL ENCOUNTER (OUTPATIENT)
Dept: VASCULAR MEDICINE | Facility: HOSPITAL | Age: 69
Discharge: HOME | End: 2024-09-11
Payer: COMMERCIAL

## 2024-09-11 DIAGNOSIS — Z01.810 ENCOUNTER FOR PREPROCEDURAL CARDIOVASCULAR EXAMINATION: ICD-10-CM

## 2024-09-11 DIAGNOSIS — Z01.818 PRE-OP EVALUATION: ICD-10-CM

## 2024-09-11 DIAGNOSIS — I72.4 ANEURYSM OF LEFT POPLITEAL ARTERY (CMS-HCC): ICD-10-CM

## 2024-09-11 DIAGNOSIS — I73.9 PERIPHERAL VASCULAR DISEASE, UNSPECIFIED (CMS-HCC): ICD-10-CM

## 2024-09-11 PROCEDURE — 93923 UPR/LXTR ART STDY 3+ LVLS: CPT

## 2024-09-11 PROCEDURE — 75635 CT ANGIO ABDOMINAL ARTERIES: CPT | Performed by: STUDENT IN AN ORGANIZED HEALTH CARE EDUCATION/TRAINING PROGRAM

## 2024-09-11 PROCEDURE — 2550000001 HC RX 255 CONTRASTS: Performed by: NURSE PRACTITIONER

## 2024-09-11 PROCEDURE — 75635 CT ANGIO ABDOMINAL ARTERIES: CPT

## 2024-09-11 PROCEDURE — 93970 EXTREMITY STUDY: CPT

## 2024-09-19 ENCOUNTER — OFFICE VISIT (OUTPATIENT)
Dept: VASCULAR SURGERY | Facility: CLINIC | Age: 69
End: 2024-09-19
Payer: COMMERCIAL

## 2024-09-19 VITALS
HEIGHT: 69 IN | DIASTOLIC BLOOD PRESSURE: 77 MMHG | WEIGHT: 223.9 LBS | SYSTOLIC BLOOD PRESSURE: 118 MMHG | HEART RATE: 65 BPM | BODY MASS INDEX: 33.16 KG/M2 | OXYGEN SATURATION: 91 %

## 2024-09-19 DIAGNOSIS — I72.4 ANEURYSM OF LEFT POPLITEAL ARTERY (CMS-HCC): Primary | ICD-10-CM

## 2024-09-19 DIAGNOSIS — I72.3 ILIAC ARTERY ANEURYSM, LEFT (CMS-HCC): ICD-10-CM

## 2024-09-19 PROCEDURE — 3008F BODY MASS INDEX DOCD: CPT | Performed by: SURGERY

## 2024-09-19 PROCEDURE — 1159F MED LIST DOCD IN RCRD: CPT | Performed by: SURGERY

## 2024-09-19 PROCEDURE — 1160F RVW MEDS BY RX/DR IN RCRD: CPT | Performed by: SURGERY

## 2024-09-19 PROCEDURE — 1126F AMNT PAIN NOTED NONE PRSNT: CPT | Performed by: SURGERY

## 2024-09-19 PROCEDURE — 3078F DIAST BP <80 MM HG: CPT | Performed by: SURGERY

## 2024-09-19 PROCEDURE — 3074F SYST BP LT 130 MM HG: CPT | Performed by: SURGERY

## 2024-09-19 PROCEDURE — 99214 OFFICE O/P EST MOD 30 MIN: CPT | Performed by: SURGERY

## 2024-09-19 PROCEDURE — 1036F TOBACCO NON-USER: CPT | Performed by: SURGERY

## 2024-09-19 ASSESSMENT — PATIENT HEALTH QUESTIONNAIRE - PHQ9
SUM OF ALL RESPONSES TO PHQ9 QUESTIONS 1 AND 2: 0
2. FEELING DOWN, DEPRESSED OR HOPELESS: NOT AT ALL
1. LITTLE INTEREST OR PLEASURE IN DOING THINGS: NOT AT ALL

## 2024-09-19 ASSESSMENT — PAIN SCALES - GENERAL: PAINLEVEL: 0-NO PAIN

## 2024-09-19 NOTE — PATIENT INSTRUCTIONS
- Repeat ALVARO in 6 months to determine left leg blood flow - currently he is able to walk   - Repeat CT scan abd/pel in 6 months to evaluate any size changes  - Followup in 6 months to discuss results  - Call radiology [phone] to schedule scan  - Call office [phone] with any questions or concerns

## 2024-09-22 DIAGNOSIS — M25.562 ARTHRALGIA OF BOTH KNEES: Primary | ICD-10-CM

## 2024-09-22 DIAGNOSIS — M25.561 ARTHRALGIA OF BOTH KNEES: Primary | ICD-10-CM

## 2024-09-23 RX ORDER — DEXAMETHASONE 4 MG/1
TABLET ORAL
Qty: 5 TABLET | Refills: 0 | Status: SHIPPED | OUTPATIENT
Start: 2024-09-23

## 2024-09-23 NOTE — PROGRESS NOTES
Vascular Surgery Clinic Note    CC: Thrombosed left popliteal artery aneurysm; left REYNALDO aneurysm    HPI:  Bipin Chatman is 68 y.o. male with history of BL LE claudication L> R. He was last seen in vascular surgery clinic with Ayleen Cunningham on 8/19/24 where he endorsed left foot pain. He does have a history of gout and states this pain is much improved after his gout medications. He did undergo a left venous US during his workup with PCP which was negative for DVT but positive for a thrombosed left popliteal artery aneurysm. He underwent CTA and presents to discuss results today.     Past Vascular History:  None    Past Vascular Testing:  CTA runoff (9/11/24)  PVR (9/11/24) - R 1.09 (0.72), L 0.55 (0.39)    Medical History:  Patient Active Problem List   Diagnosis    Benign essential hypertension    CAD (coronary artery disease)    Dyslipidemia    Hyperlipidemia    S/P CABG (coronary artery bypass graft)    Vitamin D deficiency, unspecified    Acute gout of ankle    Myositis of lower extremity    Arthralgia of both knees    COVID-19 virus infection    Pain of left calf    Aneurysm of left popliteal artery (CMS-HCC)    Claudication, intermittent (CMS-HCC)    Acute idiopathic gout of left foot        Meds:   Current Outpatient Medications on File Prior to Visit   Medication Sig Dispense Refill    apixaban (Eliquis) 5 mg tablet Take 1 tablet (5 mg) by mouth 2 times a day. 60 tablet 1    atorvastatin (Lipitor) 40 mg tablet Take 1 tablet (40 mg) by mouth once daily at bedtime. 90 tablet 3    colchicine 0.6 mg tablet TAKE 1 TABLET (0.6 MG) BY MOUTH ONCE DAILY. 90 tablet 0    febuxostat (Uloric) 80 mg tablet Take 1 tablet (80 mg) by mouth once daily. 30 tablet 2    metoprolol succinate XL (Toprol-XL) 50 mg 24 hr tablet Take 1 tablet (50 mg) by mouth once daily. 90 tablet 3    tamsulosin (Flomax) 0.4 mg 24 hr capsule Take 1 capsule (0.4 mg) by mouth once daily. 90 capsule 3    triamterene-hydrochlorothiazid (Dyazide) 37.5-25  mg capsule TAKE 1 CAPSULE BY MOUTH ONCE  DAILY 100 capsule 2    venlafaxine XR (Effexor-XR) 75 mg 24 hr capsule Take 1 capsule (75 mg) by mouth once daily. 90 capsule 3     No current facility-administered medications on file prior to visit.        Allergies:   No Known Allergies    SH:    Social Determinants of Health     Tobacco Use: Medium Risk (9/19/2024)    Patient History     Smoking Tobacco Use: Former     Smokeless Tobacco Use: Never     Passive Exposure: Never   Alcohol Use: Not on file   Financial Resource Strain: Not on file   Food Insecurity: Not on file   Transportation Needs: Not on file   Physical Activity: Not on file   Stress: Not on file   Social Connections: Not on file   Intimate Partner Violence: Not on file   Depression: Not at risk (9/19/2024)    PHQ-2     PHQ-2 Score: 0   Housing Stability: Not on file   Utilities: Not on file   Digital Equity: Not on file   Health Literacy: Not on file        FH:  No family history on file.     ROS:  All systems were reviewed and are negative except as per HPI.    Objective:  Vitals:  Vitals:    09/19/24 1610   BP: 118/77   Pulse: 65   SpO2:         Exam:  Constitutional: normal, well appearing  HEENT: normocephalic  CV: regular rate  RESP: symmetric expansion, unlabored breathing  GI: soft, nontender, nondistended  MSK: normal ROM  INT: no lesions  PSYCH: appropriate mood  NEURO: no deficits  VASC: NO foot wounds    Assessment & Plan:  Bipin Chatman is 68 y.o. male with left CIAA 2.6 cm, left popliteal aneurysm thrombosed, BL CFA ectasia    - Repeat ALVARO in 6 months to determine left leg blood flow - currently he is able to walk   - Repeat CT scan abd/pel in 6 months to evaluate any size changes  - Followup in 6 months to discuss results      Meds  - Eliquis 5 mg  - Atorvastatin 40 mg    Screening/Surveillance  - CT a/p and ALVARO in 6 months    Next Followup  - Followup in 6 months    Gertrudis Bales MD

## 2024-09-27 DIAGNOSIS — I10 BENIGN ESSENTIAL HYPERTENSION: ICD-10-CM

## 2024-09-28 RX ORDER — TRIAMTERENE AND HYDROCHLOROTHIAZIDE 37.5; 25 MG/1; MG/1
1 CAPSULE ORAL
Qty: 100 CAPSULE | Refills: 2 | Status: SHIPPED | OUTPATIENT
Start: 2024-09-28

## 2024-10-09 DIAGNOSIS — M25.562 ARTHRALGIA OF BOTH KNEES: ICD-10-CM

## 2024-10-09 DIAGNOSIS — M25.561 ARTHRALGIA OF BOTH KNEES: ICD-10-CM

## 2024-10-09 RX ORDER — DEXAMETHASONE 4 MG/1
4 TABLET ORAL DAILY
Qty: 5 TABLET | Refills: 0 | Status: SHIPPED | OUTPATIENT
Start: 2024-10-09 | End: 2024-10-14

## 2024-10-10 DIAGNOSIS — I72.4 ANEURYSM OF LEFT POPLITEAL ARTERY (CMS-HCC): ICD-10-CM

## 2024-10-10 RX ORDER — APIXABAN 5 MG/1
5 TABLET, FILM COATED ORAL 2 TIMES DAILY
Qty: 60 TABLET | Refills: 1 | Status: SHIPPED | OUTPATIENT
Start: 2024-10-10

## 2024-10-10 RX ORDER — DEXAMETHASONE 4 MG/1
TABLET ORAL
Qty: 5 TABLET | Refills: 0 | Status: SHIPPED | OUTPATIENT
Start: 2024-10-10

## 2024-10-22 ENCOUNTER — APPOINTMENT (OUTPATIENT)
Dept: RHEUMATOLOGY | Facility: CLINIC | Age: 69
End: 2024-10-22
Payer: COMMERCIAL

## 2024-10-22 VITALS
WEIGHT: 222 LBS | SYSTOLIC BLOOD PRESSURE: 123 MMHG | DIASTOLIC BLOOD PRESSURE: 78 MMHG | BODY MASS INDEX: 32.78 KG/M2 | HEART RATE: 54 BPM

## 2024-10-22 DIAGNOSIS — M79.10 MUSCLE PAIN: ICD-10-CM

## 2024-10-22 DIAGNOSIS — M25.562 ARTHRALGIA OF BOTH KNEES: Primary | ICD-10-CM

## 2024-10-22 DIAGNOSIS — M17.0 PRIMARY OSTEOARTHRITIS OF BOTH KNEES: ICD-10-CM

## 2024-10-22 DIAGNOSIS — M15.0 PRIMARY OSTEOARTHRITIS INVOLVING MULTIPLE JOINTS: ICD-10-CM

## 2024-10-22 DIAGNOSIS — M16.0 PRIMARY OSTEOARTHRITIS OF BOTH HIPS: ICD-10-CM

## 2024-10-22 DIAGNOSIS — M10.9 ACUTE GOUT OF ANKLE, UNSPECIFIED CAUSE, UNSPECIFIED LATERALITY: ICD-10-CM

## 2024-10-22 DIAGNOSIS — M25.561 ARTHRALGIA OF BOTH KNEES: Primary | ICD-10-CM

## 2024-10-22 DIAGNOSIS — M10.072 ACUTE IDIOPATHIC GOUT OF LEFT FOOT: ICD-10-CM

## 2024-10-22 PROCEDURE — 3078F DIAST BP <80 MM HG: CPT | Performed by: STUDENT IN AN ORGANIZED HEALTH CARE EDUCATION/TRAINING PROGRAM

## 2024-10-22 PROCEDURE — 99204 OFFICE O/P NEW MOD 45 MIN: CPT | Performed by: STUDENT IN AN ORGANIZED HEALTH CARE EDUCATION/TRAINING PROGRAM

## 2024-10-22 PROCEDURE — 1159F MED LIST DOCD IN RCRD: CPT | Performed by: STUDENT IN AN ORGANIZED HEALTH CARE EDUCATION/TRAINING PROGRAM

## 2024-10-22 PROCEDURE — 1036F TOBACCO NON-USER: CPT | Performed by: STUDENT IN AN ORGANIZED HEALTH CARE EDUCATION/TRAINING PROGRAM

## 2024-10-22 PROCEDURE — 3074F SYST BP LT 130 MM HG: CPT | Performed by: STUDENT IN AN ORGANIZED HEALTH CARE EDUCATION/TRAINING PROGRAM

## 2024-10-22 RX ORDER — FEBUXOSTAT 80 MG/1
80 TABLET, FILM COATED ORAL DAILY
Qty: 90 TABLET | Refills: 3 | Status: SHIPPED | OUTPATIENT
Start: 2024-10-22

## 2024-10-22 RX ORDER — COLCHICINE 0.6 MG/1
0.6 TABLET ORAL DAILY
Qty: 90 TABLET | Refills: 3 | Status: SHIPPED | OUTPATIENT
Start: 2024-10-22

## 2024-10-22 NOTE — PROGRESS NOTES
Subjective   Patient ID: Bipin Chatman is a 68 y.o. male who presents for New Patient Visit (NPV to establish care. Referred by PCP. No physical complaints or concerns.).  HPI:  New consult for gout and joint aches    Male with a history of gout not responding to allopurinol or colchicine, thrombosed left popliteal artery aneurysm, left REYNALDO aneurysm, hypertension, coronary artery disease, CABG, GERD, vitamin D deficiency, dyslipidemia, muscle aches, muscle fatigue, joint pain, here to establish care    Per patient, he has had gout since 2019; it started with ankle pain; pain got worse ; At first, patient was put on allopurinol and colchicine. Patient states colchicine helps. Uloric works better than allopurinol for patient. Last flare was 9/2024; Flares every couple months; started uloric 8/2024    Once in  a while, he would get diarrhea with colchicine    Patient has joint pain every day  in his hips, knees; same all day. Activity makes the pain worse. Has AM stiffness for a half hour.  For patients joint pain, he was using tylenol two 325 mg daily as needed for pain; dexamethasone helps with all his pain, he get dex 5 tabs of 4 mg daily for flares     Dexamethasone helps with hip and knee pain 75%; he gets 4 day bursts from his PMD.     Labs:  2019  WBC H to 12.4, Hgb L to 12.7, platelets H to 6-9    2024:  Cr normal, ALP/AST/ALT/albuin.protein nomrla  CK normal  Uric acid 6.3  ESR normal    Serologies:  2024  ANA CRISTINA neg, RF positive  to 25  Rheumatology specific review of systems   joint pain, morning stiffness>30 min , fevers , chills, unintentional weight loss, rashes, alopecia, mouth sores, nasal ulcers, photosensitivity, Raynauds, morning stiffness in back, dry eyes, dry mouth, blood clots, sister has RA, uveitis, blood or mucus in stool         Objective   /78 (BP Location: Left arm, Patient Position: Sitting, BP Cuff Size: Large adult)   Pulse 54   Wt 101 kg (222 lb)   BMI 32.78 kg/m²        Physical Exam  Constitutional: Alert and in no acute distress. Well developed, well nourished  Head and Face: Head and face: Normal.    Cardiovascular: Heart rate and rhythm were normal, normal S1 and S2. No peripheral edema.   Pulmonary: No respiratory distress. Clear bilateral breath sounds.  Musculoskeletal: tophi on L great MTP, no synovitis throughout, 5.5 strength in UE and LE       Lab Results   Component Value Date    WBC 12.4 (H) 03/05/2019    HGB 12.7 (L) 03/05/2019    HCT 40.9 (L) 03/05/2019     (H) 03/05/2019    ALT 42 11/01/2021    AST 24 11/01/2021    CREATININE 1.05 08/29/2024          Lab Results   Component Value Date    ANA CRISTINA Negative 04/10/2024    SEDRATE 13 04/10/2024    CRP 0.18 02/19/2019    RF 25 (H) 04/10/2024   \\            There is currently no information documented on the homunculus. Go to the Rheumatology activity and complete the homunculus joint exam.          Assessment/Plan:    #Gout  -Went over ACR diet, discussed about diet  -He has a tophi on left MTP; goal uric acid 5  -Continue febuxostat 80 mg daily; may uptitrate at next visit but since patient recently started August 2024, we will keep the same for now-1 year prescribed October 2024  -discussed side effects of febuxostat including but not limited to GI distress, CV risk, liver lab abnormalities; ACR handout given  -Restart colchicine 1 tablet daily-1 year prescribed October 2024  --Risks of colchicine discussed including but not limited to diarrhea.  Colchicine should be avoided in chronic kidney disease.  Patient understanding and aware of risks.  -Discussed that patient needs to stop colchicine if he has any weakness or muscle aches, given that he is also on a statin, and he needs to let me know-will check CK and aldolase with next labs  -If patient is still flaring every couple months by time of next visit, I may need to go up on the febuxostat  -His primary care doctor gives him 5 tablets of dexamethasone 4 mg  daily during gout flare; this is fine  -Side effects of systemic steroids were discussed. These side effects come from the ACR patient hand out and are including but not limited to bruising, osteoporosis (or weakened bones), diabetes, infection, hypertension, weight gain, cataracts, glaucoma, and a bone disorder called avascular necrosis. We discussed that most side effects are related to the dose administered and duration of treatment, so the goal is to use it at the lowest effective dose for the shortest period of time necessary. Discussed that though prednisone rarely has a direct interaction with other medications, there is an increased risk of infection when combining prednisone with other immunosuppressive medications.  Additionally, discussed that when taking prednisone with NSAIDs , there is an increased risk of GI ulcers. Patient handout given. Patient understanding and accepting of risks.    #joint pain responsive to dexamethasone  #OA  -It is interesting that patient states that dexamethasone bursts help his hip and knee pain about 75%; his RF is mildly elevated, ESR and CRP normal, joint exam is more consistent with osteoarthritis, will continue to monitor  -Recommend physical therapy for hips and knees-referred August 2024  -Recommended weight loss  -Will get CCP and HLA-B27 with next labs  -As needed, can use Tylenol up to 3 g a day, Voltaren gel  -Avoid NSAIDs orally due to CAD, history of CABG, aneurysm    Patient counseled to seek medical care if any new or worsening symptoms, urgently if needed.      Note will be sent to primary care doctor.    Return to clinic in3 mo, lab before, sooner if needed    Total time on this day of visit includes record and documentation review before and after visit including documentation and time not explicitly included on EMR time stamp.     Dragon dictation software was used to dictate this note. Errors may have occurred during dictation that was not intended by the  user.

## 2024-10-22 NOTE — PATIENT INSTRUCTIONS
You have gout and osteoarthritis    Continue uloric 80 mg daily    Start colchicine daily again; watch for worsening muscle aches and weakness    Lets get labs before your next visit    Reserve the steroid tapers for when you have gout flare    Try tylenol 1000 mg three times a day for pain    Please use voltaren gel as well for knee and hip pain (diclofenac 1%)    Please work on weight loss, and go to physical therapy for your hip and knee pain

## 2024-11-13 DIAGNOSIS — I25.10 ATHEROSCLEROTIC HEART DISEASE OF NATIVE CORONARY ARTERY WITHOUT ANGINA PECTORIS: ICD-10-CM

## 2024-11-14 RX ORDER — METOPROLOL SUCCINATE 50 MG/1
50 TABLET, EXTENDED RELEASE ORAL DAILY
Qty: 100 TABLET | Refills: 2 | Status: SHIPPED | OUTPATIENT
Start: 2024-11-14

## 2024-11-21 DIAGNOSIS — M10.072 ACUTE IDIOPATHIC GOUT OF LEFT FOOT: ICD-10-CM

## 2024-11-25 RX ORDER — FEBUXOSTAT 80 MG/1
80 TABLET, FILM COATED ORAL DAILY
Qty: 30 TABLET | Refills: 2 | Status: SHIPPED | OUTPATIENT
Start: 2024-11-25

## 2024-12-02 DIAGNOSIS — F41.9 ANXIETY: ICD-10-CM

## 2024-12-05 RX ORDER — VENLAFAXINE HYDROCHLORIDE 75 MG/1
75 CAPSULE, EXTENDED RELEASE ORAL DAILY
Qty: 90 CAPSULE | Refills: 3 | Status: SHIPPED | OUTPATIENT
Start: 2024-12-05

## 2024-12-09 DIAGNOSIS — M10.9 ACUTE GOUT, UNSPECIFIED CAUSE, UNSPECIFIED SITE: Primary | ICD-10-CM

## 2024-12-09 RX ORDER — PREDNISONE 20 MG/1
TABLET ORAL
Qty: 20 TABLET | Refills: 0 | Status: SHIPPED | OUTPATIENT
Start: 2024-12-09 | End: 2024-12-21

## 2024-12-12 DIAGNOSIS — N40.0 BENIGN PROSTATIC HYPERPLASIA WITHOUT LOWER URINARY TRACT SYMPTOMS: ICD-10-CM

## 2024-12-12 DIAGNOSIS — E88.810 METABOLIC SYNDROME: ICD-10-CM

## 2024-12-16 RX ORDER — TAMSULOSIN HYDROCHLORIDE 0.4 MG/1
0.4 CAPSULE ORAL DAILY
Qty: 100 CAPSULE | Refills: 2 | Status: SHIPPED | OUTPATIENT
Start: 2024-12-16

## 2024-12-27 DIAGNOSIS — I72.4 ANEURYSM OF LEFT POPLITEAL ARTERY (CMS-HCC): ICD-10-CM

## 2024-12-28 RX ORDER — APIXABAN 5 MG/1
5 TABLET, FILM COATED ORAL 2 TIMES DAILY
Qty: 60 TABLET | Refills: 1 | Status: SHIPPED | OUTPATIENT
Start: 2024-12-28

## 2025-01-23 ENCOUNTER — LAB (OUTPATIENT)
Dept: LAB | Facility: LAB | Age: 70
End: 2025-01-23
Payer: COMMERCIAL

## 2025-01-23 DIAGNOSIS — M79.10 MUSCLE PAIN: ICD-10-CM

## 2025-01-23 DIAGNOSIS — M25.561 ARTHRALGIA OF BOTH KNEES: ICD-10-CM

## 2025-01-23 DIAGNOSIS — M10.072 ACUTE IDIOPATHIC GOUT OF LEFT FOOT: ICD-10-CM

## 2025-01-23 DIAGNOSIS — M10.9 ACUTE GOUT OF ANKLE, UNSPECIFIED CAUSE, UNSPECIFIED LATERALITY: ICD-10-CM

## 2025-01-23 DIAGNOSIS — M25.562 ARTHRALGIA OF BOTH KNEES: ICD-10-CM

## 2025-01-23 LAB
ALBUMIN SERPL BCP-MCNC: 4.2 G/DL (ref 3.4–5)
ALP SERPL-CCNC: 77 U/L (ref 33–136)
ALT SERPL W P-5'-P-CCNC: 48 U/L (ref 10–52)
ANION GAP SERPL CALC-SCNC: 17 MMOL/L (ref 10–20)
AST SERPL W P-5'-P-CCNC: 28 U/L (ref 9–39)
BASOPHILS # BLD AUTO: 0.14 X10*3/UL (ref 0–0.1)
BASOPHILS NFR BLD AUTO: 1.5 %
BILIRUB SERPL-MCNC: 1 MG/DL (ref 0–1.2)
BUN SERPL-MCNC: 26 MG/DL (ref 6–23)
CALCIUM SERPL-MCNC: 9.5 MG/DL (ref 8.6–10.6)
CCP IGG SERPL-ACNC: 8 U/ML
CHLORIDE SERPL-SCNC: 101 MMOL/L (ref 98–107)
CK SERPL-CCNC: 77 U/L (ref 0–325)
CO2 SERPL-SCNC: 27 MMOL/L (ref 21–32)
CREAT SERPL-MCNC: 1.08 MG/DL (ref 0.5–1.3)
CRP SERPL-MCNC: 1.32 MG/DL
EGFRCR SERPLBLD CKD-EPI 2021: 74 ML/MIN/1.73M*2
EOSINOPHIL # BLD AUTO: 0.26 X10*3/UL (ref 0–0.7)
EOSINOPHIL NFR BLD AUTO: 2.8 %
ERYTHROCYTE [DISTWIDTH] IN BLOOD BY AUTOMATED COUNT: 13.2 % (ref 11.5–14.5)
ERYTHROCYTE [SEDIMENTATION RATE] IN BLOOD BY WESTERGREN METHOD: 6 MM/H (ref 0–20)
GLUCOSE SERPL-MCNC: 115 MG/DL (ref 74–99)
HCT VFR BLD AUTO: 50.5 % (ref 41–52)
HGB BLD-MCNC: 17 G/DL (ref 13.5–17.5)
IMM GRANULOCYTES # BLD AUTO: 0.05 X10*3/UL (ref 0–0.7)
IMM GRANULOCYTES NFR BLD AUTO: 0.5 % (ref 0–0.9)
LYMPHOCYTES # BLD AUTO: 3.02 X10*3/UL (ref 1.2–4.8)
LYMPHOCYTES NFR BLD AUTO: 32.1 %
MCH RBC QN AUTO: 30.7 PG (ref 26–34)
MCHC RBC AUTO-ENTMCNC: 33.7 G/DL (ref 32–36)
MCV RBC AUTO: 91 FL (ref 80–100)
MONOCYTES # BLD AUTO: 0.65 X10*3/UL (ref 0.1–1)
MONOCYTES NFR BLD AUTO: 6.9 %
NEUTROPHILS # BLD AUTO: 5.28 X10*3/UL (ref 1.2–7.7)
NEUTROPHILS NFR BLD AUTO: 56.2 %
NRBC BLD-RTO: 0 /100 WBCS (ref 0–0)
PLATELET # BLD AUTO: 293 X10*3/UL (ref 150–450)
POTASSIUM SERPL-SCNC: 3.8 MMOL/L (ref 3.5–5.3)
PROT SERPL-MCNC: 6.4 G/DL (ref 6.4–8.2)
RBC # BLD AUTO: 5.53 X10*6/UL (ref 4.5–5.9)
SODIUM SERPL-SCNC: 141 MMOL/L (ref 136–145)
URATE SERPL-MCNC: 5.6 MG/DL (ref 4–7.5)
WBC # BLD AUTO: 9.4 X10*3/UL (ref 4.4–11.3)

## 2025-01-23 PROCEDURE — 86200 CCP ANTIBODY: CPT

## 2025-01-23 PROCEDURE — 80053 COMPREHEN METABOLIC PANEL: CPT

## 2025-01-23 PROCEDURE — 82550 ASSAY OF CK (CPK): CPT

## 2025-01-23 PROCEDURE — 85025 COMPLETE CBC W/AUTO DIFF WBC: CPT

## 2025-01-23 PROCEDURE — 36415 COLL VENOUS BLD VENIPUNCTURE: CPT

## 2025-01-23 PROCEDURE — 81381 HLA I TYPING 1 ALLELE HR: CPT

## 2025-01-23 PROCEDURE — 84550 ASSAY OF BLOOD/URIC ACID: CPT

## 2025-01-23 PROCEDURE — 85652 RBC SED RATE AUTOMATED: CPT

## 2025-01-23 PROCEDURE — 86140 C-REACTIVE PROTEIN: CPT

## 2025-01-23 PROCEDURE — 82085 ASSAY OF ALDOLASE: CPT

## 2025-01-25 LAB — ALDOLASE SERPL-CCNC: 5.5 U/L (ref 1.2–7.6)

## 2025-01-31 ENCOUNTER — APPOINTMENT (OUTPATIENT)
Dept: PRIMARY CARE | Facility: CLINIC | Age: 70
End: 2025-01-31
Payer: COMMERCIAL

## 2025-02-01 LAB
HLA-B*58:01 BLD/T QL: NEGATIVE
HLAB27 TYPING: NEGATIVE

## 2025-02-13 NOTE — PROGRESS NOTES
Subjective   Bipin Chatman is a 69 y.o. male who is here for follow up .    HPI  Patient is a 68-year-old male with known history of benign essential hypertension coronary artery disease coronary artery bypass graft gastroesophageal reflux disease history of vitamin D deficient dyslipidemia past history of gout, patient takes medication prescribed complaining of muscle aches muscle fatigue joint pain discomfort hips knees ankles bilateral for the last 2 to 3 weeks, difficulty walking.  Patient denies fever chills nausea vomiting.  Patient follows with rheumatology, will follow with a rheumatology   associate .  Complaining of muscle pain now on statins. Also decrease energy libido and stamina.   Review of Systems  10 system reviewed with patient pertinent as above  Objective     Visit Vitals  /82   Pulse 74   Temp 36.4 °C (97.5 °F)   Resp 14        Physical Exam    HEENT: Atraumatic normocephalic the pupils are equal and round and reactive to light the sclerae nonicteric extraocular motion are intact.  Neck: Is supple without JVD no carotid bruits the trachea is midline there are no masses pulses are equal and bilateral with normal upstroke.  Skin: Normal.  Skin good texture.  Moist.  Good turgor.  No lesions, no rashes.  Lymph: No lymphadenopathy appreciated, no masses, no lesions  Lungs: Are clear to auscultation and percussion, good breath sounds bilaterally, no rhonchi, no wheezing, good diaphragmatic excursion.  Heart: Normal rate and normal rhythm S1, S2, no S3, no gallop, murmur or rub.  Abdomen: Soft, nontender, no organomegaly, good bowel sounds.    Extremities: Full range of motion, good pulses bilateral.  No cyanosis, no clubbing or edema.  Neuro: Cranial nerves II-XII are grossly intact there is no sensory or motor deficits.  Able to move all extremities.        Assessment/Plan     Fasting    Lipid  Testosterone    Ingrown toe nail rt great toe  Will refer to podiatry    Gout stable  Low purine  diet    Joint pain muscle pain hips Knees ankles Feet  Hard to walk stiffness no trauma reported  I suspect possibly statin induced myositis  Hold statins for 2 weeks felt better  Vascular surgery instructed pat to resume statin  And to resume eliquis  For aneurysm.  Follow with vascular    Statin induced myositis  Will consider PCS K9  CAD CABG X2  Statin induced myositis  Dyslipidemia      CAD CABG  Follows with cardiology  Must take cholesterol lowering meds    While we are working out other options    Elevated BMI 33.67 kg per square  Low-fat, low-cholesterol diet, exercise, daily  Ideal BMI is between 23 and 26 kg/m²  Low carbohydrate diet.  Consult regarding weight loss    Prevention  Colonoscopy 04/17/2023 next in 5 years  PSA 01/30/2024    Immunization  Flu vaccine declined  Pneumonia vaccine 01/30/204  Shingles vaccine declined  RSV  Vaccine declined    Bilateral thigh and knee pain  I suspect statin induced myositis  I instructed patient to stop Lipitor for 1 week  And report as to the results of his pain    Continue with the low-fat, low-cholesterol diet,  I recommended Mediterranean diet, which include fish, chicken, vegetables and olive oil  Exercise daily for 30 minutes at least 3 times a week  Continue home medications    Coronary artery disease  Continue current medications  Hold statins for a week  Beta-blockers  Refills given    Hypertension  No added salt diet, do not and salt to your food  Try to exercise every other day for 30 minutes  Continue current medications      Problem List Items Addressed This Visit       Hyperlipidemia    Relevant Medications    evolocumab (Repatha SureClick) 140 mg/mL injection    Other Relevant Orders    Lipid Panel    S/P CABG (coronary artery bypass graft) - Primary    Relevant Medications    evolocumab (Repatha SureClick) 140 mg/mL injection     Other Visit Diagnoses       Statin-induced myositis        Relevant Medications    evolocumab (Repatha SureClick) 140  mg/mL injection    Low testosterone        Relevant Orders    Testosterone, total and free    Hypogonadism in male        Relevant Orders    Testosterone, total and free            Christiano Lazo MD

## 2025-02-17 ENCOUNTER — APPOINTMENT (OUTPATIENT)
Dept: PRIMARY CARE | Facility: CLINIC | Age: 70
End: 2025-02-17
Payer: COMMERCIAL

## 2025-02-17 VITALS
SYSTOLIC BLOOD PRESSURE: 122 MMHG | DIASTOLIC BLOOD PRESSURE: 82 MMHG | WEIGHT: 220 LBS | TEMPERATURE: 97.5 F | RESPIRATION RATE: 14 BRPM | BODY MASS INDEX: 32.58 KG/M2 | HEIGHT: 69 IN | HEART RATE: 74 BPM

## 2025-02-17 DIAGNOSIS — M60.9 STATIN-INDUCED MYOSITIS: ICD-10-CM

## 2025-02-17 DIAGNOSIS — E78.2 MODERATE MIXED HYPERLIPIDEMIA NOT REQUIRING STATIN THERAPY: ICD-10-CM

## 2025-02-17 DIAGNOSIS — T46.6X5A STATIN-INDUCED MYOSITIS: ICD-10-CM

## 2025-02-17 DIAGNOSIS — E29.1 HYPOGONADISM IN MALE: ICD-10-CM

## 2025-02-17 DIAGNOSIS — R73.9 HYPERGLYCEMIA: ICD-10-CM

## 2025-02-17 DIAGNOSIS — Z00.00 ROUTINE GENERAL MEDICAL EXAMINATION AT HEALTH CARE FACILITY: Primary | ICD-10-CM

## 2025-02-17 DIAGNOSIS — R79.89 LOW TESTOSTERONE: ICD-10-CM

## 2025-02-17 DIAGNOSIS — Z95.1 S/P CABG (CORONARY ARTERY BYPASS GRAFT): ICD-10-CM

## 2025-02-17 PROCEDURE — G0439 PPPS, SUBSEQ VISIT: HCPCS | Performed by: INTERNAL MEDICINE

## 2025-02-17 PROCEDURE — G2211 COMPLEX E/M VISIT ADD ON: HCPCS | Performed by: INTERNAL MEDICINE

## 2025-02-17 PROCEDURE — 99497 ADVNCD CARE PLAN 30 MIN: CPT | Performed by: INTERNAL MEDICINE

## 2025-02-17 PROCEDURE — 1126F AMNT PAIN NOTED NONE PRSNT: CPT | Performed by: INTERNAL MEDICINE

## 2025-02-17 PROCEDURE — G0442 ANNUAL ALCOHOL SCREEN 15 MIN: HCPCS | Performed by: INTERNAL MEDICINE

## 2025-02-17 PROCEDURE — 1123F ACP DISCUSS/DSCN MKR DOCD: CPT | Performed by: INTERNAL MEDICINE

## 2025-02-17 PROCEDURE — 3074F SYST BP LT 130 MM HG: CPT | Performed by: INTERNAL MEDICINE

## 2025-02-17 PROCEDURE — 1159F MED LIST DOCD IN RCRD: CPT | Performed by: INTERNAL MEDICINE

## 2025-02-17 PROCEDURE — G0446 INTENS BEHAVE THER CARDIO DX: HCPCS | Performed by: INTERNAL MEDICINE

## 2025-02-17 PROCEDURE — 1160F RVW MEDS BY RX/DR IN RCRD: CPT | Performed by: INTERNAL MEDICINE

## 2025-02-17 PROCEDURE — G0447 BEHAVIOR COUNSEL OBESITY 15M: HCPCS | Performed by: INTERNAL MEDICINE

## 2025-02-17 PROCEDURE — G0444 DEPRESSION SCREEN ANNUAL: HCPCS | Performed by: INTERNAL MEDICINE

## 2025-02-17 PROCEDURE — 99214 OFFICE O/P EST MOD 30 MIN: CPT | Performed by: INTERNAL MEDICINE

## 2025-02-17 PROCEDURE — 3008F BODY MASS INDEX DOCD: CPT | Performed by: INTERNAL MEDICINE

## 2025-02-17 PROCEDURE — 1170F FXNL STATUS ASSESSED: CPT | Performed by: INTERNAL MEDICINE

## 2025-02-17 PROCEDURE — 3079F DIAST BP 80-89 MM HG: CPT | Performed by: INTERNAL MEDICINE

## 2025-02-17 PROCEDURE — 80061 LIPID PANEL: CPT | Performed by: INTERNAL MEDICINE

## 2025-02-17 PROCEDURE — 1158F ADVNC CARE PLAN TLK DOCD: CPT | Performed by: INTERNAL MEDICINE

## 2025-02-17 RX ORDER — EVOLOCUMAB 140 MG/ML
140 INJECTION, SOLUTION SUBCUTANEOUS
Qty: 6 EACH | Refills: 3 | Status: SHIPPED | OUTPATIENT
Start: 2025-02-17 | End: 2026-02-17

## 2025-02-17 ASSESSMENT — PAIN SCALES - GENERAL: PAINLEVEL_OUTOF10: 0-NO PAIN

## 2025-02-17 ASSESSMENT — ANXIETY QUESTIONNAIRES
3. WORRYING TOO MUCH ABOUT DIFFERENT THINGS: NOT AT ALL
IF YOU CHECKED OFF ANY PROBLEMS ON THIS QUESTIONNAIRE, HOW DIFFICULT HAVE THESE PROBLEMS MADE IT FOR YOU TO DO YOUR WORK, TAKE CARE OF THINGS AT HOME, OR GET ALONG WITH OTHER PEOPLE: NOT DIFFICULT AT ALL
5. BEING SO RESTLESS THAT IT IS HARD TO SIT STILL: NOT AT ALL
1. FEELING NERVOUS, ANXIOUS, OR ON EDGE: NOT AT ALL
2. NOT BEING ABLE TO STOP OR CONTROL WORRYING: NOT AT ALL
7. FEELING AFRAID AS IF SOMETHING AWFUL MIGHT HAPPEN: NOT AT ALL
6. BECOMING EASILY ANNOYED OR IRRITABLE: NOT AT ALL
GAD7 TOTAL SCORE: 0
4. TROUBLE RELAXING: NOT AT ALL

## 2025-02-17 ASSESSMENT — ACTIVITIES OF DAILY LIVING (ADL)
GROCERY SHOPPING: INDEPENDENT
DOING HOUSEWORK: INDEPENDENT
ADEQUATE_TO_COMPLETE_ADL: YES
PREPARING MEALS: INDEPENDENT
PILL BOX USED: NO
USING TELEPHONE: INDEPENDENT
HEARING - RIGHT EAR: FUNCTIONAL
EATING: INDEPENDENT
WALKS IN HOME: INDEPENDENT
TOILETING: INDEPENDENT
GROOMING: INDEPENDENT
BATHING: INDEPENDENT
HEARING - LEFT EAR: FUNCTIONAL
PATIENT'S MEMORY ADEQUATE TO SAFELY COMPLETE DAILY ACTIVITIES?: YES
NEEDS ASSISTANCE WITH FOOD: INDEPENDENT
JUDGMENT_ADEQUATE_SAFELY_COMPLETE_DAILY_ACTIVITIES: YES
FEEDING YOURSELF: INDEPENDENT
USING TRANSPORTATION: INDEPENDENT
MANAGING FINANCES: INDEPENDENT
STIL DRIVING: YES
TAKING MEDICATION: INDEPENDENT
DRESSING YOURSELF: INDEPENDENT

## 2025-02-17 ASSESSMENT — ENCOUNTER SYMPTOMS
OCCASIONAL FEELINGS OF UNSTEADINESS: 0
DEPRESSION: 0
LOSS OF SENSATION IN FEET: 0

## 2025-02-17 ASSESSMENT — GERIATRIC MINI NUTRITIONAL ASSESSMENT (MNA)
A HAS FOOD INTAKE DECLINED OVER THE PAST 3 MONTHS DUE TO LOSS OF APPETITE, DIGESTIVE PROBLEMS, CHEWING OR SWALLOWING DIFFICULTIES?: NO DECREASE IN FOOD INTAKE
B WEIGHT LOSS DURING THE LAST 3 MONTHS: NO WEIGHT LOSS
C GENERAL MOBILITY: GOES OUT
D HAS SUFFERED PSYCHOLOGICAL STRESS OR ACUTE DISEASE IN THE PAST 3 MONTHS?: NO

## 2025-02-17 NOTE — PROGRESS NOTES
"Subjective   Reason for Visit: Bipin Chatman is an 69 y.o. male here for a Medicare Wellness visit.   Reviewed all medications by prescribing practitioner or clinical pharmacist (such as prescriptions, OTCs, herbal therapies and supplements) and documented in the medical record.    HPI  Bipin is 69 is self-sufficient lives at home he voices no major complaints of for diffuse joint pain in the setting of statins.  He denies fever chills cough nausea vomiting.  He reports no falling.  Patient Care Team:  Christiano Lazo MD as PCP - General  Christiano Lazo MD as PCP - [employer] PCP  Christiano Lazo MD     Review of Systems  10 system review pertinent as above  Objective   Vitals:  /82   Pulse 74   Temp 36.4 °C (97.5 °F)   Resp 14   Ht 1.753 m (5' 9\")   Wt 99.8 kg (220 lb)   BMI 32.49 kg/m²       Physical Exam  HEENT: Atraumatic normocephalic the pupils are equal and round and reactive to light the sclerae nonicteric extraocular motion are intact.  Neck: Is supple without JVD no carotid bruits the trachea is midline there are no masses pulses are equal and bilateral with normal upstroke.  Skin: Normal.  Skin good texture.  Moist.  Good turgor.  No lesions, no rashes.  Lymph: No lymphadenopathy appreciated, no masses, no lesions  Lungs: Are clear to auscultation and percussion, good breath sounds bilaterally, no rhonchi, no wheezing, good diaphragmatic excursion.  Heart: Normal rate and normal rhythm S1, S2, no S3, no gallop, murmur or rub.  Abdomen: Soft, nontender, no organomegaly, good bowel sounds.    Extremities: Full range of motion, good pulses bilateral.  No cyanosis, no clubbing or edema.  Neuro: Cranial nerves II-XII are grossly intact there is no sensory or motor deficits.  Able to move all extremities.  Assessment & Plan  S/P CABG (coronary artery bypass graft)    Orders:    evolocumab (Repatha SureClick) 140 mg/mL injection; Inject 1 mL (140 mg) under the skin every 14 (fourteen) " days.    Moderate mixed hyperlipidemia not requiring statin therapy    Orders:    evolocumab (Repatha SureClick) 140 mg/mL injection; Inject 1 mL (140 mg) under the skin every 14 (fourteen) days.    Lipid Panel    Statin-induced myositis    Orders:    evolocumab (Repatha SureClick) 140 mg/mL injection; Inject 1 mL (140 mg) under the skin every 14 (fourteen) days.    Low testosterone    Orders:    Testosterone, total and free    Hypogonadism in male    Orders:    Testosterone, total and free    Routine general medical examination at health care facility    Orders:    1 Year Follow Up In Primary Care - Wellness Exam; Future    BMI 32.0-32.9,adult    Orders:    semaglutide 0.25 mg or 0.5 mg (2 mg/3 mL) pen injector; Inject 0.25 mg under the skin 1 (one) time per week.    Hyperglycemia    Orders:    semaglutide 0.25 mg or 0.5 mg (2 mg/3 mL) pen injector; Inject 0.25 mg under the skin 1 (one) time per week.    Medicare wellness

## 2025-02-17 NOTE — ASSESSMENT & PLAN NOTE
Orders:    evolocumab (Repatha SureClick) 140 mg/mL injection; Inject 1 mL (140 mg) under the skin every 14 (fourteen) days.    Lipid Panel

## 2025-02-17 NOTE — ASSESSMENT & PLAN NOTE
Orders:    evolocumab (Repatha SureClick) 140 mg/mL injection; Inject 1 mL (140 mg) under the skin every 14 (fourteen) days.

## 2025-02-18 LAB
CHOLEST SERPL-MCNC: 185 MG/DL (ref 0–199)
CHOLESTEROL/HDL RATIO: 5 (ref 4.2–7)
HDLC SERPL-MCNC: 37 MG/DL (ref 40–59)
IS PATIENT FASTING: YES
LDLC SERPL DIRECT ASSAY-MCNC: 90 MG/DL (ref 0–100)
TRIGL SERPL-MCNC: 417 MG/DL

## 2025-02-22 LAB
TESTOST FREE SERPL-MCNC: 44.7 PG/ML (ref 35–155)
TESTOST SERPL-MCNC: 260 NG/DL (ref 250–1100)

## 2025-02-23 DIAGNOSIS — M10.072 ACUTE IDIOPATHIC GOUT OF LEFT FOOT: ICD-10-CM

## 2025-02-24 RX ORDER — FEBUXOSTAT 80 MG/1
80 TABLET, FILM COATED ORAL DAILY
Qty: 90 TABLET | Refills: 1 | Status: SHIPPED | OUTPATIENT
Start: 2025-02-24

## 2025-02-25 DIAGNOSIS — I25.10 ATHEROSCLEROTIC HEART DISEASE OF NATIVE CORONARY ARTERY WITHOUT ANGINA PECTORIS: ICD-10-CM

## 2025-02-25 DIAGNOSIS — E78.2 MODERATE MIXED HYPERLIPIDEMIA NOT REQUIRING STATIN THERAPY: ICD-10-CM

## 2025-02-25 DIAGNOSIS — T46.6X5A STATIN-INDUCED MYOSITIS: ICD-10-CM

## 2025-02-25 DIAGNOSIS — M60.9 STATIN-INDUCED MYOSITIS: ICD-10-CM

## 2025-02-25 DIAGNOSIS — Z95.1 S/P CABG (CORONARY ARTERY BYPASS GRAFT): ICD-10-CM

## 2025-02-25 RX ORDER — EVOLOCUMAB 140 MG/ML
140 INJECTION, SOLUTION SUBCUTANEOUS
Qty: 6 EACH | Refills: 3 | Status: SHIPPED | OUTPATIENT
Start: 2025-02-25 | End: 2026-02-25

## 2025-02-25 RX ORDER — METOPROLOL SUCCINATE 50 MG/1
50 TABLET, EXTENDED RELEASE ORAL DAILY
Qty: 100 TABLET | Refills: 2 | Status: SHIPPED | OUTPATIENT
Start: 2025-02-25

## 2025-02-25 RX ORDER — METOPROLOL SUCCINATE 50 MG/1
50 TABLET, EXTENDED RELEASE ORAL DAILY
Qty: 90 TABLET | Refills: 2 | OUTPATIENT
Start: 2025-02-25

## 2025-02-28 ENCOUNTER — APPOINTMENT (OUTPATIENT)
Dept: RHEUMATOLOGY | Facility: CLINIC | Age: 70
End: 2025-02-28
Payer: COMMERCIAL

## 2025-02-28 VITALS
WEIGHT: 224 LBS | OXYGEN SATURATION: 90 % | DIASTOLIC BLOOD PRESSURE: 89 MMHG | HEART RATE: 71 BPM | TEMPERATURE: 97.7 F | BODY MASS INDEX: 33.08 KG/M2 | SYSTOLIC BLOOD PRESSURE: 161 MMHG

## 2025-02-28 DIAGNOSIS — M19.90 OSTEOARTHRITIS, UNSPECIFIED OSTEOARTHRITIS TYPE, UNSPECIFIED SITE: ICD-10-CM

## 2025-02-28 DIAGNOSIS — M10.9 GOUT, UNSPECIFIED CAUSE, UNSPECIFIED CHRONICITY, UNSPECIFIED SITE: Primary | ICD-10-CM

## 2025-02-28 PROCEDURE — 1159F MED LIST DOCD IN RCRD: CPT | Performed by: INTERNAL MEDICINE

## 2025-02-28 PROCEDURE — 3079F DIAST BP 80-89 MM HG: CPT | Performed by: INTERNAL MEDICINE

## 2025-02-28 PROCEDURE — 3077F SYST BP >= 140 MM HG: CPT | Performed by: INTERNAL MEDICINE

## 2025-02-28 PROCEDURE — 99203 OFFICE O/P NEW LOW 30 MIN: CPT | Performed by: INTERNAL MEDICINE

## 2025-02-28 NOTE — PROGRESS NOTES
Subjective   Patient ID: Bipin Chatman is a 69 y.o. male who presents for New Patient Visit (npv).    HPI  68 y.o. male who presents for New Patient Visit (NPV to establish care. Referred by PCP. No physical complaints or concerns.).  HPI:  New consult for gout and joint aches     Male with a history of gout not responding to allopurinol or colchicine, thrombosed left popliteal artery aneurysm, left REYNALDO aneurysm, hypertension, coronary artery disease, CABG, GERD, vitamin D deficiency, dyslipidemia, muscle aches, muscle fatigue, joint pain, here to establish care     Per patient, he has had gout since 2019; it started with ankle pain; pain got worse ; At first, patient was put on allopurinol and colchicine. Patient states colchicine helps. Uloric works better than allopurinol for patient. Last flare was 9/2024; Flares every couple months; started uloric 8/2024     Once in  a while, he would get diarrhea with colchicine     Patient has joint pain every day  in his hips, knees; same all day. Activity makes the pain worse. Has AM stiffness for a half hour.  For patients joint pain, he was using tylenol two 325 mg daily as needed for pain; dexamethasone helps with all his pain, he get dex 5 tabs of 4 mg daily for flares      Dexamethasone helps with hip and knee pain 75%; he gets 4 day bursts from his PMD.      Labs:  2019  WBC H to 12.4, Hgb L to 12.7, platelets H to 6-9     2024:  Cr normal, ALP/AST/ALT/albuin.protein nomrla  CK normal  Uric acid 6.3  ESR normal     Serologies:  2024  ANA CRISTINA neg, RF positive  to 25  Rheumatology specific review of systems   joint pain, morning stiffness>30 min , fevers , chills, unintentional weight loss, rashes, alopecia, mouth sores, nasal ulcers, photosensitivity, Raynauds, morning stiffness in back, dry eyes, dry mouth, blood clots, sister has RA, uveitis, blood or mucus in stool     02/25:  He reports stable joint pain, no recent gout flare  He endorses right hip and left big toe  pain    ROS  Joint pain in hands: negative   Joint swelling: negative  Morning stiffness and duration: neg   strength: normal  Oral ulcer: negative  Genital ulcer: negative  Raynaud phenomenon: negative  Chest pain/dyspnea: negative  Low back pain: negative  Visual problem: negative  Dry eyes/dry mouth: negative  Skin rash/scaling/psoriasis: negative       Objective     PEXAM  VS reviewed, WNL  General: Alert, no distress   HEENT: Normocephalic/atraumatic, No alopecia. PERRLA. Sclera white, conjunctiva pink, no malar rash. no oral or nasal ulcer. Oral cavity pink and moist, no erythema or exudate, dentition good.   Neck: supple  Respiratory: CTA B, no adventitious breath sounds  Cardiac: RRR, no murmurs, carotid, or bruits  Abdominal: symmetrical, soft, non-tender, non-distended, normoactive BSx4 quadrants, no CVA tenderness or suprapubic tenderness  MSK: Joints of upper and lower extremities were assessed for synovitis and ROM.    Today she has no evidence of synovitis in the joints of her hands or wrists, tender joint count 0, swollen joint count 0   Extremities: no clubbing, no cyanosis, no edema  Skin: Skin warm and moist.   Neuro: non-focal, Strength 5/5 throughout. Normal gait. No cerebellar pathologic exam     Assessment/Plan   #Gout  -Went over ACR diet, discussed about diet  -using alcohol a couple of times per week  -He has a tophi on left MTP; goal uric acid 5  -His recent uric acid 5.6 and coming down from 10.2  -Continue febuxostat 80 mg daily; may uptitrate at next visit but since patient recently started August 2024, we will keep the same for now-1 year prescribed October 2024  -Restart colchicine 1 tablet every other day-1 year prescribed October 2024  -His primary care doctor gives him 5 tablets of dexamethasone 4 mg daily during gout flare; this is fine     #joint pain responsive to dexamethasone  #OA  X-rays showed right hip moderate and right knee mild OA  -His RF and CCP are mildly positive  (25 and 8), but no clinical findings of hand arthritis, RA like clinical findings  -will use Tylenol and NSAID prn  -will see Dr. Viveros in 3-4 months

## 2025-03-04 ENCOUNTER — APPOINTMENT (OUTPATIENT)
Dept: PODIATRY | Facility: CLINIC | Age: 70
End: 2025-03-04
Payer: COMMERCIAL

## 2025-03-04 DIAGNOSIS — L60.0 INGROWING NAIL, RIGHT GREAT TOE: ICD-10-CM

## 2025-03-04 DIAGNOSIS — M79.671 PAIN IN BOTH FEET: Primary | ICD-10-CM

## 2025-03-04 DIAGNOSIS — M79.672 PAIN IN BOTH FEET: Primary | ICD-10-CM

## 2025-03-04 PROCEDURE — 1036F TOBACCO NON-USER: CPT | Performed by: PODIATRIST

## 2025-03-04 PROCEDURE — 1159F MED LIST DOCD IN RCRD: CPT | Performed by: PODIATRIST

## 2025-03-04 PROCEDURE — 1160F RVW MEDS BY RX/DR IN RCRD: CPT | Performed by: PODIATRIST

## 2025-03-04 PROCEDURE — 99212 OFFICE O/P EST SF 10 MIN: CPT | Performed by: PODIATRIST

## 2025-03-04 NOTE — PROGRESS NOTES
History of Present Illness:   Patient states they are here for toe pain  Concern of ingrown nail  Denies trauma        Past Medical History  Past Medical History:   Diagnosis Date    Anxiety disorder, unspecified 05/14/2019    Anxiety and depression    Personal history of other diseases of the respiratory system 03/01/2016    History of bronchitis    Personal history of other endocrine, nutritional and metabolic disease     History of hypercholesterolemia    Personal history of other mental and behavioral disorders 01/23/2019    History of anxiety    Personal history of other specified conditions 04/03/2019    History of nocturia       Medications and Allergies have been reviewed.    Review Of Systems:  GENERAL: No weight loss, malaise or fevers.  HEENT: Negative for frequent or significant headaches,   RESPIRATORY: Negative for cough, wheezing or shortness of breath.  CARDIOVASCULAR: Negative for chest pain, leg swelling or palpitations.    Examination of Both Lower Extremities:   Objective:   Vasc: DP and PT pulses are palpable bilateral.  CFT is less than 3 seconds bilateral.  Skin temperature is warm to cool proximal to distal bilateral.      Neuro: Vibratory, light touch and proprioception are intact bilateral.    Derm: Nails 1-5 bilateral are intact. Thick and discolored.  Skin is supple with normal texture and turgor noted.  Webspaces are clean, dry and intact bilateral.  There are no hyperkeratoses, ulcerations, verruca or other lesions noted.      Ortho: Muscle strength is 5/5 for all pedal groups tested.   Decreased 1st MTPJ Rom. Pain to dorsal midfoot. No edema, erythema or ecchymosis noted.     1. Pain in both feet        2. Ingrowing nail, right great toe            Patient exam and eval  R lateral . Debrided. No SOI noted  No need for oral abx  Fu if no improvement noted  All questions answered    Amy Robledo DPM  [phone]  [facility] [address]  Fax: [phone]

## 2025-03-19 ENCOUNTER — ANCILLARY PROCEDURE (OUTPATIENT)
Dept: VASCULAR MEDICINE | Facility: CLINIC | Age: 70
End: 2025-03-19
Payer: COMMERCIAL

## 2025-03-19 ENCOUNTER — HOSPITAL ENCOUNTER (OUTPATIENT)
Dept: RADIOLOGY | Facility: CLINIC | Age: 70
Discharge: HOME | End: 2025-03-19
Payer: COMMERCIAL

## 2025-03-19 DIAGNOSIS — I72.3 ILIAC ARTERY ANEURYSM, LEFT (CMS-HCC): ICD-10-CM

## 2025-03-19 DIAGNOSIS — I72.4 ANEURYSM OF LEFT POPLITEAL ARTERY (CMS-HCC): ICD-10-CM

## 2025-03-19 DIAGNOSIS — I73.9 PERIPHERAL VASCULAR DISEASE, UNSPECIFIED (CMS-HCC): ICD-10-CM

## 2025-03-19 PROCEDURE — 93922 UPR/L XTREMITY ART 2 LEVELS: CPT | Performed by: SURGERY

## 2025-03-19 PROCEDURE — 93922 UPR/L XTREMITY ART 2 LEVELS: CPT

## 2025-03-19 PROCEDURE — 74176 CT ABD & PELVIS W/O CONTRAST: CPT

## 2025-03-24 DIAGNOSIS — M25.562 ARTHRALGIA OF BOTH KNEES: ICD-10-CM

## 2025-03-24 DIAGNOSIS — M25.561 ARTHRALGIA OF BOTH KNEES: ICD-10-CM

## 2025-03-24 RX ORDER — DEXAMETHASONE 4 MG/1
4 TABLET ORAL DAILY
Qty: 5 TABLET | Refills: 0 | Status: SHIPPED | OUTPATIENT
Start: 2025-03-24

## 2025-03-25 DIAGNOSIS — E78.5 DYSLIPIDEMIA: ICD-10-CM

## 2025-03-26 RX ORDER — ATORVASTATIN CALCIUM 40 MG/1
40 TABLET, FILM COATED ORAL NIGHTLY
Qty: 100 TABLET | Refills: 2 | Status: SHIPPED | OUTPATIENT
Start: 2025-03-26

## 2025-03-27 ENCOUNTER — OFFICE VISIT (OUTPATIENT)
Dept: VASCULAR SURGERY | Facility: CLINIC | Age: 70
End: 2025-03-27
Payer: COMMERCIAL

## 2025-03-27 VITALS
SYSTOLIC BLOOD PRESSURE: 134 MMHG | HEIGHT: 69 IN | DIASTOLIC BLOOD PRESSURE: 85 MMHG | HEART RATE: 68 BPM | WEIGHT: 224.8 LBS | BODY MASS INDEX: 33.3 KG/M2 | OXYGEN SATURATION: 90 %

## 2025-03-27 DIAGNOSIS — I72.4 ANEURYSM OF LEFT POPLITEAL ARTERY: Primary | ICD-10-CM

## 2025-03-27 DIAGNOSIS — I72.3 ILIAC ARTERY ANEURYSM, LEFT (CMS-HCC): ICD-10-CM

## 2025-03-27 PROCEDURE — 99215 OFFICE O/P EST HI 40 MIN: CPT | Performed by: SURGERY

## 2025-03-27 PROCEDURE — 3008F BODY MASS INDEX DOCD: CPT | Performed by: SURGERY

## 2025-03-27 PROCEDURE — 3079F DIAST BP 80-89 MM HG: CPT | Performed by: SURGERY

## 2025-03-27 PROCEDURE — 1159F MED LIST DOCD IN RCRD: CPT | Performed by: SURGERY

## 2025-03-27 PROCEDURE — 1036F TOBACCO NON-USER: CPT | Performed by: SURGERY

## 2025-03-27 PROCEDURE — 3075F SYST BP GE 130 - 139MM HG: CPT | Performed by: SURGERY

## 2025-03-27 PROCEDURE — 1126F AMNT PAIN NOTED NONE PRSNT: CPT | Performed by: SURGERY

## 2025-03-27 PROCEDURE — 1160F RVW MEDS BY RX/DR IN RCRD: CPT | Performed by: SURGERY

## 2025-03-27 ASSESSMENT — PAIN SCALES - GENERAL: PAINLEVEL_OUTOF10: 0-NO PAIN

## 2025-03-27 NOTE — PATIENT INSTRUCTIONS
- Followup in 6 months with repeat ALVARO, RLE arterial duplex, and aortic duplex   - Call office [phone] with any questions or concerns

## 2025-03-27 NOTE — PROGRESS NOTES
Vascular Surgery Clinic Note    CC: Left CIAA, BL CFA ectasia, thrombosed left popliteal artery aneurysm, right popliteal artery aneurysm    HPI:  Bipin Chatman is 69 y.o. male with history of multiple peripheral aneurysms here for routine surveillance. 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 He denies chest, abdominal or back pain. 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 He has a thrombosed left popliteal artery aneurysm with 1.25 mi claudication in his calves; pain relieved with rest. This does not currently affect his lifestyle. He has a small 1.8 cm right popliteal artery aneurysm with mural thrombus and no episodes of distal embolization. He does not smoke cigarettes but does smoke cigars rarely. He endorses left hip and thigh pain attributed to arthritis, but also states it could be statin related and that when his statin is held for medical reasons he is symptomatically better    Past Vascular History:  None     Past Vascular Testing:  ALVARO (3/19/25) - R 1.24 (0.65), L 0.60 (0.41)  CT a/p noncontrast (3/19/25)   CTA runoff (9/11/24)  PVR (9/11/24) - R 1.09 (0.72), L 0.55 (0.39)      Medical History:  Patient Active Problem List   Diagnosis    Benign essential hypertension    CAD (coronary artery disease)    Dyslipidemia    Hyperlipidemia    S/P CABG (coronary artery bypass graft)    Vitamin D deficiency, unspecified    Acute gout of ankle    Myositis of lower extremity    Arthralgia of both knees    COVID-19 virus infection    Pain of left calf    Aneurysm of left popliteal artery (CMS-HCC)    Claudication, intermittent (CMS-[facility])    Acute idiopathic gout of left foot        Meds:   Current Outpatient Medications on File Prior to Visit   Medication Sig Dispense Refill    atorvastatin (Lipitor) 40 mg tablet TAKE 1 TABLET BY MOUTH ONCE  DAILY AT BEDTIME 100 tablet 2    colchicine 0.6 mg tablet Take 1 tablet (0.6 mg) by mouth once daily. 90  tablet 3    dexAMETHasone (Decadron) 4 mg tablet Take 1 tablet (4 mg) by mouth once daily. 5 tablet 0    Eliquis 5 mg tablet TAKE 1 TABLET BY MOUTH TWICE A DAY 60 tablet 1    febuxostat (Uloric) 80 mg tablet TAKE 1 TABLET BY MOUTH EVERY DAY 90 tablet 1    metoprolol succinate XL (Toprol-XL) 50 mg 24 hr tablet Take 1 tablet (50 mg) by mouth once daily. 100 tablet 2    tamsulosin (Flomax) 0.4 mg 24 hr capsule TAKE 1 CAPSULE BY MOUTH ONCE  DAILY 100 capsule 2    triamterene-hydrochlorothiazid (Dyazide) 37.5-25 mg capsule TAKE 1 CAPSULE BY MOUTH ONCE  DAILY 100 capsule 2    venlafaxine XR (Effexor-XR) 75 mg 24 hr capsule TAKE 1 CAPSULE BY MOUTH ONCE  DAILY 90 capsule 3    dexAMETHasone (Decadron) 4 mg tablet Take 1 tablet (4 mg) by mouth once daily for 5 days. 5 tablet 0    evolocumab (Repatha SureClick) 140 mg/mL injection Inject 1 mL (140 mg) under the skin every 14 (fourteen) days. 6 each 3    semaglutide 0.25 mg or 0.5 mg (2 mg/3 mL) pen injector Inject 0.25 mg under the skin 1 (one) time per week. 2 mL 2    [DISCONTINUED] atorvastatin (Lipitor) 40 mg tablet Take 1 tablet (40 mg) by mouth once daily at bedtime. 90 tablet 3    [DISCONTINUED] dexAMETHasone (Decadron) 4 mg tablet TAKE 1 TABLET BY MOUTH ONCE  DAILY WITH BREAKFAST FOR 5 DAYS 5 tablet 0     No current facility-administered medications on file prior to visit.        Allergies:   No Known Allergies    SH:    Social Drivers of Health     Tobacco Use: Medium Risk (3/27/2025)    Patient History     Smoking Tobacco Use: Former     Smokeless Tobacco Use: Never     Passive Exposure: Never   Alcohol Use: Not on file   Financial Resource Strain: Not on file   Food Insecurity: Not on file   Transportation Needs: Not on file   Physical Activity: Not on file   Stress: Not on file   Social Connections: Not on file   Intimate Partner Violence: Not on file   Depression: Not at risk (3/27/2025)    PHQ-2     PHQ-2 Score: 0   Housing Stability: Not on file   Utilities: Not  on file   Digital Equity: Not on file   Health Literacy: Not on file        FH:  No family history on file.     ROS:  All systems were reviewed and are negative except as per HPI.    Objective:  Vitals:  Vitals:    03/27/25 1002   BP: 134/85   Pulse: 68   SpO2:         Exam:  Constitutional: normal, well appearing  HEENT: normocephalic  CV: regular rate  RESP: symmetric expansion, unlabored breathing  GI: soft, nontender, nondistended  MSK: normal ROM  INT: no lesions  PSYCH: appropriate mood  NEURO: no deficits  VASC: right palpable PT, left nonpalpable pedal pulses but foot warm and no wounds    Assessment & Plan:  Bipin Chatman is 69 y.o. male with asymptomatic left CIAA 2.7 cm, BL CFA ectasia, thrombosed left popliteal artery aneurysm with 1.25 mi calf claudication, and right popliteal artery aneurysm 1.8 cm    - Patient prefers to hold off on any intervention at this time. We did discuss right popliteal artery aneurysm has mural thrombus and offered bypass to treat this side, though overall aneurysm size is small and this is not urgently indicated. Also discussed if his claudication worsens on the left side or affects his daily living, we could consider left fem pop bypass though this would involve left CFA interposition repair as well given ectasia with thrombus.  - Followup in 6 months with repeat ALVARO, RLE arterial duplex, and aortic duplex       Meds  - Eliquis 5 mg  - Atovastatin 40 mg    Screening/Surveillance  - ALVARO, RLE arterial duplex, aortoiliac duplex (September 2025)    Next Followup  - 6 months    Gertrudis Bales MD

## 2025-04-16 ENCOUNTER — TELEMEDICINE (OUTPATIENT)
Dept: PRIMARY CARE | Facility: CLINIC | Age: 70
End: 2025-04-16
Payer: COMMERCIAL

## 2025-04-16 DIAGNOSIS — J20.9 ACUTE BRONCHITIS, UNSPECIFIED ORGANISM: Primary | ICD-10-CM

## 2025-04-16 DIAGNOSIS — R09.82 POSTNASAL DRIP: ICD-10-CM

## 2025-04-16 PROCEDURE — 1160F RVW MEDS BY RX/DR IN RCRD: CPT | Performed by: INTERNAL MEDICINE

## 2025-04-16 PROCEDURE — 1159F MED LIST DOCD IN RCRD: CPT | Performed by: INTERNAL MEDICINE

## 2025-04-16 PROCEDURE — 99213 OFFICE O/P EST LOW 20 MIN: CPT | Performed by: INTERNAL MEDICINE

## 2025-04-16 RX ORDER — AZITHROMYCIN 250 MG/1
TABLET, FILM COATED ORAL
Qty: 6 TABLET | Refills: 0 | Status: SHIPPED | OUTPATIENT
Start: 2025-04-16 | End: 2025-04-21

## 2025-04-16 NOTE — PROGRESS NOTES
Subjective   Bipin Chatman is a 69 y.o. male who is here for follow up for virtual visit.    HPI  Patient is a 69-year-old male with known history of benign essential hypertension coronary artery disease coronary artery bypass graft gastroesophageal reflux disease history of vitamin D deficient dyslipidemia past history of gout, patient complaining of postnasal drip and a cough for last few days, he is just returning from [place] after golf trip, he denies fever chills no loss of smell no loss of taste.  Review of Systems  10 system reviewed with patient pertinent as above  Objective   Virtual visit  There were no vitals taken for this visit.     Physical Exam    Virtual visit  Assessment/Plan     Virtual visit  Postnasal drip and a cough  With phlegm colored green or yellow  Did not test for COVID  There is no fever chills  Azithromycin as directed  Do not take colchicine during azithromycin  Patient voiced full understanding    Gout  Doing well  Continue low purine diet  Hold colchicine for now      Joint pain muscle pain hips Knees ankles Feet  Hard to walk stiffness no trauma reported  I suspect possibly statin induced myositis  Hold statins for 2 weeks felt better  Vascular surgery instructed pat to resume statin  And to resume eliquis  For aneurysm.  Follow with vascular    Statin induced myositis  Will consider PCS K9  CAD CABG X2  Statin induced myositis  Dyslipidemia      CAD CABG  Follows with cardiology  Must take cholesterol lowering meds    While we are working out other options    Elevated BMI 33.67 kg per square  Low-fat, low-cholesterol diet, exercise, daily  Ideal BMI is between 23 and 26 kg/m²  Low carbohydrate diet.  Consult regarding weight loss    Prevention  Colonoscopy 04/17/2023 next in 5 years  PSA 01/30/2024    Immunization  Flu vaccine declined  Pneumonia vaccine 01/30/204  Shingles vaccine declined  RSV  Vaccine declined    Bilateral thigh and knee pain  I suspect statin induced  myositis  I instructed patient to stop Lipitor for 1 week  And report as to the results of his pain    Continue with the low-fat, low-cholesterol diet,  I recommended Mediterranean diet, which include fish, chicken, vegetables and olive oil  Exercise daily for 30 minutes at least 3 times a week  Continue home medications    Coronary artery disease  Continue current medications  Hold statins for a week  Beta-blockers  Refills given    Hypertension  No added salt diet, do not and salt to your food  Try to exercise every other day for 30 minutes  Continue current medications      Problem List Items Addressed This Visit       Acute bronchitis - Primary    Relevant Medications    azithromycin (Zithromax) 250 mg tablet    Postnasal drip       Christiano Lazo MD

## 2025-04-17 ENCOUNTER — APPOINTMENT (OUTPATIENT)
Dept: OPHTHALMOLOGY | Age: 70
End: 2025-04-17
Payer: COMMERCIAL

## 2025-04-22 DIAGNOSIS — M10.9 ACUTE GOUT OF ANKLE, UNSPECIFIED CAUSE, UNSPECIFIED LATERALITY: ICD-10-CM

## 2025-04-22 RX ORDER — COLCHICINE 0.6 MG/1
0.6 TABLET ORAL DAILY
Qty: 90 TABLET | Refills: 1 | Status: SHIPPED | OUTPATIENT
Start: 2025-04-22 | End: 2025-04-23 | Stop reason: SDUPTHER

## 2025-04-23 DIAGNOSIS — M10.9 ACUTE GOUT OF ANKLE, UNSPECIFIED CAUSE, UNSPECIFIED LATERALITY: ICD-10-CM

## 2025-04-23 RX ORDER — COLCHICINE 0.6 MG/1
0.6 TABLET ORAL DAILY
Qty: 90 TABLET | Refills: 1 | Status: SHIPPED | OUTPATIENT
Start: 2025-04-23

## 2025-05-27 ENCOUNTER — APPOINTMENT (OUTPATIENT)
Dept: CARDIOLOGY | Facility: CLINIC | Age: 70
End: 2025-05-27
Payer: COMMERCIAL

## 2025-05-30 ENCOUNTER — APPOINTMENT (OUTPATIENT)
Dept: OPHTHALMOLOGY | Facility: CLINIC | Age: 70
End: 2025-05-30
Payer: COMMERCIAL

## 2025-06-02 DIAGNOSIS — I10 BENIGN ESSENTIAL HYPERTENSION: ICD-10-CM

## 2025-06-02 DIAGNOSIS — M10.9 ACUTE GOUT, UNSPECIFIED CAUSE, UNSPECIFIED SITE: ICD-10-CM

## 2025-06-02 RX ORDER — PREDNISONE 20 MG/1
TABLET ORAL
Qty: 20 TABLET | Refills: 0 | Status: SHIPPED | OUTPATIENT
Start: 2025-06-02 | End: 2025-06-14

## 2025-06-04 RX ORDER — TRIAMTERENE AND HYDROCHLOROTHIAZIDE 37.5; 25 MG/1; MG/1
1 CAPSULE ORAL
Qty: 100 CAPSULE | Refills: 2 | Status: SHIPPED | OUTPATIENT
Start: 2025-06-04

## 2025-06-17 ENCOUNTER — APPOINTMENT (OUTPATIENT)
Dept: CARDIOLOGY | Facility: CLINIC | Age: 70
End: 2025-06-17
Payer: COMMERCIAL

## 2025-06-17 VITALS
HEART RATE: 68 BPM | SYSTOLIC BLOOD PRESSURE: 115 MMHG | WEIGHT: 209.3 LBS | OXYGEN SATURATION: 93 % | HEIGHT: 69 IN | BODY MASS INDEX: 31 KG/M2 | DIASTOLIC BLOOD PRESSURE: 73 MMHG

## 2025-06-17 DIAGNOSIS — E78.2 MODERATE MIXED HYPERLIPIDEMIA NOT REQUIRING STATIN THERAPY: Primary | ICD-10-CM

## 2025-06-17 DIAGNOSIS — I25.10 CORONARY ARTERY DISEASE INVOLVING NATIVE CORONARY ARTERY OF NATIVE HEART WITHOUT ANGINA PECTORIS: ICD-10-CM

## 2025-06-17 PROCEDURE — 3074F SYST BP LT 130 MM HG: CPT | Performed by: INTERNAL MEDICINE

## 2025-06-17 PROCEDURE — 3008F BODY MASS INDEX DOCD: CPT | Performed by: INTERNAL MEDICINE

## 2025-06-17 PROCEDURE — 1126F AMNT PAIN NOTED NONE PRSNT: CPT | Performed by: INTERNAL MEDICINE

## 2025-06-17 PROCEDURE — 1159F MED LIST DOCD IN RCRD: CPT | Performed by: INTERNAL MEDICINE

## 2025-06-17 PROCEDURE — 3078F DIAST BP <80 MM HG: CPT | Performed by: INTERNAL MEDICINE

## 2025-06-17 PROCEDURE — 99214 OFFICE O/P EST MOD 30 MIN: CPT | Performed by: INTERNAL MEDICINE

## 2025-06-17 PROCEDURE — 99212 OFFICE O/P EST SF 10 MIN: CPT | Mod: 25

## 2025-06-17 PROCEDURE — 93005 ELECTROCARDIOGRAM TRACING: CPT | Performed by: INTERNAL MEDICINE

## 2025-06-17 PROCEDURE — 93010 ELECTROCARDIOGRAM REPORT: CPT | Performed by: INTERNAL MEDICINE

## 2025-06-17 RX ORDER — EZETIMIBE 10 MG/1
10 TABLET ORAL DAILY
Qty: 90 TABLET | Refills: 3 | Status: SHIPPED | OUTPATIENT
Start: 2025-06-17 | End: 2026-06-17

## 2025-06-17 RX ORDER — EVOLOCUMAB 140 MG/ML
140 INJECTION, SOLUTION SUBCUTANEOUS
Qty: 6 ML | Refills: 3 | Status: SHIPPED | OUTPATIENT
Start: 2025-06-17 | End: 2026-06-17

## 2025-06-17 ASSESSMENT — ENCOUNTER SYMPTOMS
DEPRESSION: 0
OCCASIONAL FEELINGS OF UNSTEADINESS: 0
LOSS OF SENSATION IN FEET: 0

## 2025-06-17 ASSESSMENT — PATIENT HEALTH QUESTIONNAIRE - PHQ9
1. LITTLE INTEREST OR PLEASURE IN DOING THINGS: NOT AT ALL
2. FEELING DOWN, DEPRESSED OR HOPELESS: NOT AT ALL
SUM OF ALL RESPONSES TO PHQ9 QUESTIONS 1 AND 2: 0

## 2025-06-17 ASSESSMENT — PAIN SCALES - GENERAL: PAINLEVEL_OUTOF10: 0-NO PAIN

## 2025-06-17 NOTE — PATIENT INSTRUCTIONS
Thanks for coming to today's cardiology visit.  I am concerned regarding your heart disease and vascular disease risk.  I do not think your current cholesterol-lowering medicine is doing the complete job.  Please moderate your alcohol use to no more than 4 drink equivalents a week.  Drink equivalents is 5 ounces wine, a 12 ounce beer, or 1-1/2 ounces of hard liquor.  Decreasing your alcohol intake will have a direct effect on lowering your triglycerides.  I would like you to start ezetimibe 10 mg another form of cholesterol-lowering medication.  I placed an order through our home delivery pharmacy for one of the injectable cholesterol-lowering medicines called Repatha and placed a referral to our clinical pharmacists they will do the prior authorization and maybe we can get your insurance to cover it.  I think this would be a better choice than what you are currently taking I would not be associated with the musculoskeletal symptoms you are currently having.  Please continue your walking program and continue your other medication.  Please call me with any questions [phone] or message me through [employer].  Please make a 3 to 4-month follow-up prior to that visit we should recheck a lipid profile.  I did see a new change in your EKG I want to make sure your blood flow to your heart is normal.  Please call [phone] to schedule the stress MRI..  I will call you with the results

## 2025-06-17 NOTE — PROGRESS NOTES
Primary Care Physician: Christiano Lazo MD  Date of Visit: 06/17/2025  2:20 PM EDT  Location of visit: [facility] [address]     Chief Complaint:   Chief Complaint   Patient presents with    Follow-up   Chief complaint to establish cardiac care.  Recent clinical course and labs reviewed     HPI / Summary:   Bipin Chatman is a 69 y.o. male presents for new cardiovascular evaluation. No ref. provider found   Cardiac catheterization in February 2019 showed 75% left main underwent LIMA to LAD and RAMIRO graft to OM 2 with preserved EF.  History of peripheral arterial disease    September 2024 moderate disease at the femoral level decreased digital perfusion with monophasic flow in the left PAD a left PTA and left DP with multiphasic flow in the left common femoral no significant right-sided disease  ALVARO in March of this year moderate disease on the left none on the right    CT abdomen and pelvis done in March showed marked atherosclerotic disease and areas of aneurysmal dilatation particularly involving the iliacs left greater than right    Follows with vascular surgery Dr. Gertrudis Bales:  The following was excerpted from her last note dated 3/27/2025:   multiple peripheral aneurysms here for routine surveillance. 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 He denies chest, abdominal or back pain. 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 He has a thrombosed left popliteal artery aneurysm with 1.25 mi claudication in his calves; pain relieved with rest. This does not currently affect his lifestyle. He has a small 1.8 cm right popliteal artery aneurysm with mural thrombus and no episodes of distal embolization.   There was some discussion with regard to surgical intervention patient declined at this point he was not found to have significant lifestyle limiting claudication he is to have follow-up vascular studies in a few months maintained on Eliquis and  atorvastatin.  1.5 miles walking has left calf claudication. Slightly worse  Cigar q month  Etoh a couple nights/week 2-3 drinks  Has gout    Has joint muscle complaints with atorva    Specialty Problems          Cardiology Problems    Benign essential hypertension    CAD (coronary artery disease)    Dyslipidemia    Hyperlipidemia    S/P CABG (coronary artery bypass graft)    Aneurysm of left popliteal artery    Claudication, intermittent        Medical History[1]       Surgical History[2]       Social History:   reports that he has quit smoking. His smoking use included cigarettes. He started smoking about 45 years ago. He has a 40 pack-year smoking history. He has never been exposed to tobacco smoke. He has never used smokeless tobacco. He reports current alcohol use. He reports that he does not use drugs.      Allergies:  RX Allergies[3]    Outpatient Medications:  Current Outpatient Medications   Medication Instructions    atorvastatin (LIPITOR) 40 mg, oral, Nightly    colchicine 0.6 mg, oral, Daily    dexAMETHasone (DECADRON) 4 mg, oral, Daily    Eliquis 5 mg, oral, 2 times daily    febuxostat (ULORIC) 80 mg, oral, Daily    metoprolol succinate XL (TOPROL-XL) 50 mg, oral, Daily    tamsulosin (FLOMAX) 0.4 mg, oral, Daily    triamterene-hydrochlorothiazid (Dyazide) 37.5-25 mg capsule 1 capsule, oral, Daily RT    venlafaxine XR (EFFEXOR-XR) 75 mg, oral, Daily       ROS     Physical Exam:  There were no vitals filed for this visit.  Wt Readings from Last 5 Encounters:   03/27/25 102 kg (224 lb 12.8 oz)   02/28/25 102 kg (224 lb)   02/17/25 99.8 kg (220 lb)   10/22/24 101 kg (222 lb)   09/19/24 102 kg (223 lb 14.4 oz)     There is no height or weight on file to calculate BMI.   Physical Exam   Well-appearing male in no acute distress.  Flat JVP.  Normal carotid upstrokes without bruits.  Regular rhythm without gallop.  Clear lungs without crackles.  Soft abdomen without masses.  No dependent edema with intact right  pedal pulses diminished left pedal pulse  Last Labs:  CMP:  Recent Labs     01/23/25  0908 08/29/24  1148 03/05/19  1100 02/28/19  0504 02/27/19  0446    137 143 143 146*   K 3.8 3.7 5.2 3.4* 3.4*    102 105 109* 110*   CO2 27 23 27 26 27   ANIONGAP 17 16 16 11 12   BUN 26* 27* 20 24* 30*   CREATININE 1.08 1.05 1.15 1.09 1.28   EGFR 74 77  --   --   --    GLUCOSE 115* 125* 127* 111* 126*     Recent Labs     01/23/25  0908 11/01/21  1453 03/05/19  1100 02/28/19  0504 02/27/19  0446 02/21/19  1753 02/19/19  1419   ALBUMIN 4.2 4.2 3.7 3.1* 3.2*   < > 3.9   ALKPHOS 77 92  --   --   --   --  59   ALT 48 42  --   --   --   --  47   AST 28 24  --   --   --   --  25   BILITOT 1.0 1.4*  --   --   --   --  1.0    < > = values in this interval not displayed.     CBC:  Recent Labs     01/23/25  0908 03/05/19  1100 02/28/19  0504 02/27/19  0446 02/26/19  0505   WBC 9.4 12.4* 10.6 9.8 10.2   HGB 17.0 12.7* 10.9* 11.3* 11.6*   HCT 50.5 40.9* 33.3* 34.2* 35.1*    609* 264 243 206   MCV 91 95 93 94 93     COAG:   Recent Labs     07/19/24  1133 02/27/19  0446 02/26/19  0505 02/25/19  0418 02/24/19  0410 02/23/19  0329   INR  --  1.1 1.2* 1.2* 1.2* 1.3*   DDIMERVTE 4,440*  --   --   --   --   --      HEME/ENDO:  Recent Labs     02/19/19  1420   HGBA1C 5.6      CARDIAC:   Recent Labs     02/19/19  1419   BNP 16     Recent Labs     02/17/25  1505   CHOL 185   HDL 37.0*   TRIG 417*       Last Cardiology Tests:  ECG:    ECG new anterior T wave inversions  Echo:  Echo Results:  No results found for this or any previous visit from the past 3650 days.       Cath:      Stress Test:  Stress Results:  No results found for this or any previous visit from the past 365 days.         Cardiac Imaging:  CT abdomen pelvis wo IV contrast  Narrative: Interpreted By:  Ramón Mathur,   STUDY:  CT ABDOMEN PELVIS WO IV CONTRAST;  3/19/2025 10:20 am      INDICATION:  Signs/Symptoms:left iliac aneurysm, interval change.      COMPARISON:  CT  09/11/2024      ACCESSION NUMBER(S):  [id number]      ORDERING CLINICIAN:  MATTY LINDER      TECHNIQUE:  CT of the abdomen and pelvis was performed. Contiguous axial images  were obtained at 3 mm slice thickness through the abdomen and pelvis.  Coronal and sagittal reconstructions at 3 mm slice thickness were  performed.  No intravenous contrast was administered.      FINDINGS:  Please note that the evaluation of vessels, lymph nodes and organs is  limited without intravenous contrast.      LOWER CHEST:  6 mm medial left lung base nodule similar to prior. Mild patchy  bibasilar infiltrates and/or atelectasis. Partially imaged coronary  calcifications.      ABDOMEN:      LIVER:  Diffuse fatty liver.      BILE DUCTS:  No significant biliary ductal dilatation.      GALLBLADDER:  2.4 cm gallstone.      PANCREAS:  Within normal limits.      SPLEEN:  Within normal limits.      ADRENAL GLANDS:  Similar low-density right adrenal thickening/nodularity up to 9 mm  thickness probably due to adenoma and/or hyperplasia.      KIDNEYS AND URETERS:  Right kidney is surgically absent.Multiple low-density left renal  lesions redemonstrated likely representing cysts measuring up to 4.5  cm although incompletely characterized. Small nonobstructing stones  or vascular calcifications left renal sinus region up to 3-4 mm.  Lobulated renal contour suboptimally evaluated without IV contrast.  2.6 cm focal hypoattenuation left renal sinus region could represent  renal sinus cyst or focal dilatation left renal pelvis but similar to  prior. There is no hydroureter or ureteral stone.      PELVIS:      BLADDER:  Mild nonspecific urinary bladder wall thickening. Within the right  pelvis partially abutting portion of urinary bladder and prostate  gland, there is an irregular lobulated hypoattenuating structure  measuring up to 4.4 x 2 cm axial and 6.9 cm craniocaudal, image 118.  There appear to be some dependent calcifications/stones  involving  portions of this lesion. Overall this appears unchanged compared to  prior.      REPRODUCTIVE ORGANS:  Enlarged prostate gland.      BOWEL:  Stomach poorly distended limiting evaluation. Apparent 5.5 cm  duodenal diverticulum protruding about the pancreatic head. No  definite bowel obstruction. Prominent colonic stool. Appendix not  definitely seen. Colonic diverticulosis most notably descending and  sigmoid colon. Some wall thickening versus incomplete distention  descending and sigmoid colon but no definite significant new regional  inflammatory changes. Approximate 9 mm rounded vague focal  density/stranding anterior to the descending colon, image 61, could  reflect sequela of epiploic appendagitis but unchanged since prior.      VESSELS:  Markedatherosclerotic changes are noted involving the aorta and  branching vessels. Unchanged focal prominence at the origin of the  SMA up to 1.5 cm caliber. Similar mild prominence at the distal  abdominal aorta up to 2.7 cm caliber image 61 and similar aneurysmal  dilatation of the iliac arteries up to 2.6 cm on the left and 2 cm on  the right. Similar aneurysmal dilatation left femoral artery up to 2  cm image 133 and similar aneurysmal prominence right femoral artery  up to 1.8 cm image 131. Similar aneurysmal prominence distal right  common iliac artery up to 2 cm. Tortuous iliac arteries. Apparent  left and right IVCs extending inferiorly with the left continuing as  common iliac vein and appearing to branch into left external and  internal as well as right internal iliac veins and branches. The  right IVC continues as common iliac and right external iliac vein  into femoral vein. This is likely anatomic variant and similar to  prior.      PERITONEUM/RETROPERITONEUM/LYMPH NODES:  No free air or significant free fluid otherwise seen. No definite new  suspicious abdominopelvic lymphadenopathy is present.      ABDOMINAL WALL:  Moderate left and small right fat  containing inguinal hernias  redemonstrated.      BONES:  Multilevel degenerative changes visualized spine. Minimal  anterolisthesis L5 on S1. Bridging anterior osteophytes can be seen  in the setting of ankylosing spondylitis and/or DISH. Mild  degenerative changes of the hip joints.      Impression: 1.  Redemonstration marked atherosclerotic vascular disease and areas  of aneurysmal dilatation particularly involving the iliac arteries  left greater than right but also femoral arteries and elsewhere as  described, overall grossly unchanged since prior. Evaluation  otherwise limited without IV contrast. Continued follow-up  recommended.  2. Colonic diverticulosis most notably descending and sigmoid colon  with some associated wall thickening versus incomplete distention but  no definite new significant inflammatory changes. 9 mm focal rounded  density/stranding adjacent to descending thoracic aorta in the left  abdomen laterally as described, could reflect focus of epiploic  appendagitis but unchanged since prior. Clinical correlation and  follow-up advised.  3. Diffuse fatty liver. Correlate with LFTs.  4. Cholelithiasis with 2.4 cm gallstone redemonstrated.  5. Status post right nephrectomy overall similar in appearance.  Multiple low-density left renal lesions favored to represent cysts  although incompletely characterized and there is prominent left renal  sinus cyst versus dilatation left renal pelvis but overall similar to  prior. No significant hydroureter or ureteral stone. Small  nonobstructing stones or vascular calcifications left renal sinus  region. Unchanged elongated lobulated hypoattenuating structure at  the right pelvis as described felt probably to represent  dilated/lobulated ureteral stump containing stones.  6. Enlarged prostate gland. Correlate with serum PSA. Mild  nonspecific urinary bladder wall thickening.  7. Moderate left and small right fat containing inguinal  hernias  redemonstrated.  8. 6 mm medial left lung base nodule similar to prior. Consider  follow-up chest CT in 6-12 months to evaluate consider stability.  9. Additional findings as above.          MACRO:  None      Signed by: Ramón Estradachavez 3/20/2025 11:12 AM  Dictation workstation:   [id number]        Assessment/Plan     69-year-old male with a history of left main disease status post two-vessel bypass in 2019 subsequently found to have PAD with moderate stenosis on the left side with iliac and femoral aneurysms.  These are currently being managed medically but followed by vascular surgery.  He has elevated lipids specifically hypertriglyceridemia likely exacerbated by his alcohol intake.  His ECG shows new anterior T wave inversions that were not present from prior records.  Therefore I have recommended several things first and foremost I would like to get him on a more effective lipid-lowering regimen I think he would be a good candidate for PCSK9 I.  There is thinking of starting on rosuvastatin but his current gout medication would increase level to the extent that can cause some muscle injury so I think that is not an option.  He has ongoing RADHA so would be great to be able to get him off of the atorvastatin.  Ezetimibe 10 would be helpful in any case and that has been ordered.  I ordered PCSK9 I through our home delivery pharmacy and placed a clinical pharmacy referral.  Will evaluate his coronary reserve given the new EKG changes with a stress CMR.  Prior to his next 3-month appointment he will get a repeat lipid profile thank you for your referral    Orders:  No orders of the defined types were placed in this encounter.     Followup Appts:  Future Appointments   Date Time Provider Department Center   9/30/2025  8:00 AM [facility] ROOM [id number] [facility]   9/30/2025  9:00 AM [address] ROOM [id number] [facility]   9/30/2025 10:00 AM [address] ROOM [id number]  AHU [facility]   10/9/2025 10:00 AM Gertrudis Bales MD [id number] [facility]   2/12/2026 11:00 AM Christiano Lazo MD [id number] [facility]           ____________________________________________________________  Goran Niño MD    Senior Attending Physician  [facility]  [facility]       [1]   Past Medical History:  Diagnosis Date    Anxiety disorder, unspecified 05/14/2019    Anxiety and depression    Personal history of other diseases of the respiratory system 03/01/2016    History of bronchitis    Personal history of other endocrine, nutritional and metabolic disease     History of hypercholesterolemia    Personal history of other mental and behavioral disorders 01/23/2019    History of anxiety    Personal history of other specified conditions 04/03/2019    History of nocturia   [2]   Past Surgical History:  Procedure Laterality Date    KNEE ARTHROSCOPY W/ DEBRIDEMENT  07/31/2013    Arthroscopy Knee Right    OTHER SURGICAL HISTORY  03/20/2019    Coronary artery bypass graft   [3] No Known Allergies

## 2025-06-18 LAB
ATRIAL RATE: 72 BPM
P AXIS: 23 DEGREES
P OFFSET: 196 MS
P ONSET: 140 MS
PR INTERVAL: 174 MS
Q ONSET: 227 MS
QRS COUNT: 12 BEATS
QRS DURATION: 82 MS
QT INTERVAL: 364 MS
QTC CALCULATION(BAZETT): 398 MS
QTC FREDERICIA: 387 MS
R AXIS: 15 DEGREES
T AXIS: 126 DEGREES
T OFFSET: 409 MS
VENTRICULAR RATE: 72 BPM

## 2025-06-25 PROCEDURE — RXMED WILLOW AMBULATORY MEDICATION CHARGE

## 2025-06-26 ENCOUNTER — PHARMACY VISIT (OUTPATIENT)
Dept: PHARMACY | Facility: CLINIC | Age: 70
End: 2025-06-26
Payer: COMMERCIAL

## 2025-07-10 ENCOUNTER — APPOINTMENT (OUTPATIENT)
Dept: PHARMACY | Facility: HOSPITAL | Age: 70
End: 2025-07-10
Payer: COMMERCIAL

## 2025-07-10 DIAGNOSIS — I25.10 CORONARY ARTERY DISEASE INVOLVING NATIVE CORONARY ARTERY OF NATIVE HEART WITHOUT ANGINA PECTORIS: ICD-10-CM

## 2025-07-10 DIAGNOSIS — E78.2 MODERATE MIXED HYPERLIPIDEMIA NOT REQUIRING STATIN THERAPY: ICD-10-CM

## 2025-07-10 RX ORDER — ATORVASTATIN CALCIUM 40 MG/1
40 TABLET, FILM COATED ORAL DAILY
COMMUNITY

## 2025-07-10 NOTE — PATIENT INSTRUCTIONS
Repatha Monitoring and Education:  Side effects to expect with Repatha include injection site pain/swelling/redness, cold-like symptoms, headache, and rash   Monitor cholesterol levels before treatment, 4 to 12 weeks after the start of treatment, and then every 3 to 12 months thereafter.   Administration:  Repatha must be kept refrigerated, if necessary a pen may be kept at room temperature for up to 30 days.  Remove the Pen from the refrigerator. Leave the base cap on until you are ready to inject.  Check the Pen label to make sure you have the right medicine and it has not . Additionally, ensure that the solution is not cloudy, discolored, or has particles in it.  Prior to administration, wash and rinse hands.   Next, choose your injection site (You may inject into your abdomen, thigh; Another person may give you the injection in your upper arm)  Change (rotate) your injection site with each dose. You may use the same area of your body, but be sure to choose a different injection site in that area.  Once administration site has been selected, use a new alcohol swab to sanitize the administration site and allow to air dry.  Pull off the orange cap only when you are ready to inject. The orange cap can not be left off for more than 5 minutes as this can cause the medication to dry out.   Stretch or pinch your injection site to create a firm surface and place the yellow safety guard on your skin at a 90 degree angle.  Firmly push down the autoinjector onto your skin until it stops moving. When you are ready to inject press the gray start button. You will hear 2 clicks, one at the start of the injection and another once the injection is complete. Continue pushing down on your skin for about 15 seconds. The window will turn yellow when the injection is complete.   Dispose of the pen in a sharps container (Coffee can, laundry detergent bottle)  Injections will should be done on the same day every other week, if  you forget you have up to 7 days to remember to do your next dose. If less than 7 days remain before the next scheduled dose, skip the missed dose and administer the next dose on the regularly scheduled day.

## 2025-07-10 NOTE — PROGRESS NOTES
Clinical Pharmacy Appointment    Patient ID: Bipin Chatman is a 69 y.o. male who presents for Hyperlipidemia.    Pt is here for First appointment.     Referring Provider: Goran Niño MD  Last visit with referring provider: 6/17/2025   Next visit with referring provider: Not scheduled    PCP: Christiano Lazo MD    Subjective     Interval History  Cardiology visit:   69-year-old male with a history of left main disease status post two-vessel bypass in 2019 subsequently found to have PAD with moderate stenosis on the left side with iliac and femoral aneurysms.  These are currently being managed medically but followed by vascular surgery.  He has elevated lipids specifically hypertriglyceridemia likely exacerbated by his alcohol intake.  His ECG shows new anterior T wave inversions that were not present from prior records.  Therefore I have recommended several things first and foremost I would like to get him on a more effective lipid-lowering regimen I think he would be a good candidate for PCSK9 I.  There is thinking of starting on rosuvastatin but his current gout medication would increase level to the extent that can cause some muscle injury so I think that is not an option.  He has ongoing RADHA so would be great to be able to get him off of the atorvastatin.  Ezetimibe 10 would be helpful in any case and that has been ordered.  I ordered PCSK9 I through our home delivery pharmacy and placed a clinical pharmacy referral.  Will evaluate his coronary reserve given the new EKG changes with a stress CMR.  Prior to his next 3-month appointment he will get a repeat lipid profile thank you for your referral    HYPERLIPIDEMIA ASSESSMENT  Secondary prevention    LDL Goal: <55 mg/dL    Medication Therapy  Current medications include:  Atorvastatin 40 mg: take one tablet by mouth once daily  Ezetimibe 10 mg: Take one tablet by mouth once daily  Repatha Sureclick 140 mg: Inject 140 mg under the skin once every 14 days    Clarifications to above regimen: Repatha not started  Adverse Effects: None    Medication Reconciliation:  See rooming tab    Drug Interactions  Colchicine + Atorvastatin: Concurrent use of the statin drugs and colchicine may increase the risk of myopathy or rhabdomyolysis, which is characterized by progressive muscle weakness and pain in the presence of a normal neurological exam.    Medication System Management  Affordability/Accessibility:   Repatha sureclick 140 mg - 6 ML $141  PA approval through 12/17/2025   Patient Assistance Program (PAP):  Application for program to be submitted for the following medications: Eliquis 5 mg and Repatha 140 mg   Prescription Insurance:  Yes   Paid Test Claim:  Yes   County of Permanent Address:  [facility]   Members of Household:  2   Files Taxes:  Yes       Objective   Allergies[1]  Social History     Social History Narrative    Not on file      Medication Review  Current Outpatient Medications   Medication Instructions    atorvastatin (LIPITOR) 40 mg, Daily    colchicine 0.6 mg, oral, Daily    dexAMETHasone (DECADRON) 4 mg, oral, Daily    Eliquis 5 mg, oral, 2 times daily    ezetimibe (ZETIA) 10 mg, oral, Daily    febuxostat (ULORIC) 80 mg, oral, Daily    hydroxychloroquine (PlaqueniL) 200 mg tablet 2 tabs daily    metoprolol succinate XL (TOPROL-XL) 50 mg, oral, Daily    predniSONE (Deltasone) 5 mg tablet prednisone 15 mg  (3 tabs) daily for 2 weeks , then 12.5 mg (2.5 tabs) daily for 2 weeks, then 10 mg daily (2 tabs)  until followup    Repatha SureClick 140 mg, subcutaneous, Every 14 days    tamsulosin (FLOMAX) 0.4 mg, oral, Daily    triamterene-hydrochlorothiazid (Dyazide) 37.5-25 mg capsule 1 capsule, oral, Daily RT    venlafaxine XR (EFFEXOR-XR) 75 mg, oral, Daily      Vitals  BP Readings from Last 2 Encounters:   07/15/25 143/80   06/17/25 115/73     BMI Readings from Last 1 Encounters:   07/15/25 30.75 kg/m²      Labs  A1C  Lab Results   Component Value Date     "HGBA1C 5.6 02/19/2019     BMP  Lab Results   Component Value Date    CALCIUM 9.2 07/15/2025     07/15/2025    K 3.1 (L) 07/15/2025    CO2 27 07/15/2025     07/15/2025    BUN 25 07/15/2025    CREATININE 1.15 07/15/2025    EGFR 69 07/15/2025     LFTs  Lab Results   Component Value Date    ALT 59 (H) 07/15/2025    AST 38 (H) 07/15/2025    ALKPHOS 76 07/15/2025    BILITOT 1.4 (H) 07/15/2025     FLP  Lab Results   Component Value Date    TRIG 417 (H) 02/17/2025    CHOL 185 02/17/2025    HDL 37.0 (L) 02/17/2025     Urine Microalbumin  No results found for: \"MICROALBCREA\"    Weight Management  Wt Readings from Last 3 Encounters:   07/15/25 94.4 kg (208 lb 3.2 oz)   06/17/25 94.9 kg (209 lb 4.8 oz)   03/27/25 102 kg (224 lb 12.8 oz)      There is no height or weight on file to calculate BMI.     Assessment/Plan   Problem List Items Addressed This Visit       CAD (coronary artery disease)    uncontrolled with last clinic  mg/dL and LDL unavailable due to TG >400 mg/dL on 7/15/2025. Statin therapy discontinued after last cardiology visit due to statin related muscle adverse effects. Today we discussed initiation of evolocumab in the settings of hyperlipidemia and CAD. Injections will be completed by his spouse.     LFTs are elevated above the normal limit   Lab Results   Component Value Date    ALT 59 (H) 07/15/2025    AST 38 (H) 07/15/2025    ALKPHOS 76 07/15/2025    BILITOT 1.4 (H) 07/15/2025      Medication Changes:  CONTINUE  Ezetimibe 10 mg  STOP  Atorvastatin 40 mg once daily - RADHA  START  Repatha sureclick 140 mg every  14 days    Education and Monitoring:  Repatha Monitoring and Education:  Side effects to expect with Repatha include injection site pain/swelling/redness, cold-like symptoms, headache, and rash   Monitor cholesterol levels before treatment, 4 to 12 weeks after the start of treatment, and then every 3 to 12 months thereafter.   Administration:  Repatha must be kept refrigerated, if " necessary a pen may be kept at room temperature for up to 30 days.  Remove the Pen from the refrigerator. Leave the base cap on until you are ready to inject.  Check the Pen label to make sure you have the right medicine and it has not . Additionally, ensure that the solution is not cloudy, discolored, or has particles in it.  Prior to administration, wash and rinse hands.   Next, choose your injection site (You may inject into your abdomen, thigh; Another person may give you the injection in your upper arm)  Change (rotate) your injection site with each dose. You may use the same area of your body, but be sure to choose a different injection site in that area.  Once administration site has been selected, use a new alcohol swab to sanitize the administration site and allow to air dry.  Pull off the orange cap only when you are ready to inject. The orange cap can not be left off for more than 5 minutes as this can cause the medication to dry out.   Stretch or pinch your injection site to create a firm surface and place the yellow safety guard on your skin at a 90 degree angle.  Firmly push down the autoinjector onto your skin until it stops moving. When you are ready to inject press the gray start button. You will hear 2 clicks, one at the start of the injection and another once the injection is complete. Continue pushing down on your skin for about 15 seconds. The window will turn yellow when the injection is complete.   Dispose of the pen in a sharps container (Coffee can, laundry detergent bottle)  Injections will should be done on the same day every other week, if you forget you have up to 7 days to remember to do your next dose. If less than 7 days remain before the next scheduled dose, skip the missed dose and administer the next dose on the regularly scheduled day.   Goal LDL: <55 mg/dL  Focus on implementing dietary changes as discussed.  Recheck lipid panel in 4-12 weeks.         Relevant Medications     atorvastatin (Lipitor) 40 mg tablet    Hyperlipidemia    Relevant Medications    atorvastatin (Lipitor) 40 mg tablet     Clinical Pharmacist follow-up: 08/01/2025 at 2:20 am, Telehealth visit    Continue all meds under the continuation of care with the referring provider and clinical pharmacy team.    Thank you,  Alvarez Rosa, PharmD    Clinical Pharmacist  [phone]    Verbal consent to manage patient's drug therapy was obtained from the patient. They were informed they may decline to participate or withdraw from participation in pharmacy services at any time.         [1] No Known Allergies

## 2025-07-13 DIAGNOSIS — I72.4 ANEURYSM OF LEFT POPLITEAL ARTERY: ICD-10-CM

## 2025-07-14 RX ORDER — APIXABAN 5 MG/1
5 TABLET, FILM COATED ORAL 2 TIMES DAILY
Qty: 60 TABLET | Refills: 1 | Status: SHIPPED | OUTPATIENT
Start: 2025-07-14

## 2025-07-15 ENCOUNTER — TELEPHONE (OUTPATIENT)
Dept: RHEUMATOLOGY | Facility: CLINIC | Age: 70
End: 2025-07-15

## 2025-07-15 ENCOUNTER — APPOINTMENT (OUTPATIENT)
Dept: RHEUMATOLOGY | Facility: CLINIC | Age: 70
End: 2025-07-15
Payer: COMMERCIAL

## 2025-07-15 VITALS
WEIGHT: 208.2 LBS | HEIGHT: 69 IN | DIASTOLIC BLOOD PRESSURE: 80 MMHG | BODY MASS INDEX: 30.84 KG/M2 | SYSTOLIC BLOOD PRESSURE: 143 MMHG | HEART RATE: 75 BPM | OXYGEN SATURATION: 95 %

## 2025-07-15 DIAGNOSIS — M10.9 GOUT, UNSPECIFIED CAUSE, UNSPECIFIED CHRONICITY, UNSPECIFIED SITE: Primary | ICD-10-CM

## 2025-07-15 DIAGNOSIS — M16.0 PRIMARY OSTEOARTHRITIS OF BOTH HIPS: ICD-10-CM

## 2025-07-15 DIAGNOSIS — M25.561 ARTHRALGIA OF BOTH KNEES: ICD-10-CM

## 2025-07-15 DIAGNOSIS — M19.90 OSTEOARTHRITIS, UNSPECIFIED OSTEOARTHRITIS TYPE, UNSPECIFIED SITE: ICD-10-CM

## 2025-07-15 DIAGNOSIS — M17.0 PRIMARY OSTEOARTHRITIS OF BOTH KNEES: ICD-10-CM

## 2025-07-15 DIAGNOSIS — M05.79 RHEUMATOID ARTHRITIS INVOLVING MULTIPLE SITES WITH POSITIVE RHEUMATOID FACTOR (MULTI): ICD-10-CM

## 2025-07-15 DIAGNOSIS — M25.562 ARTHRALGIA OF BOTH KNEES: ICD-10-CM

## 2025-07-15 DIAGNOSIS — Z79.899 LONG-TERM USE OF PLAQUENIL: ICD-10-CM

## 2025-07-15 DIAGNOSIS — M79.10 MUSCLE PAIN: ICD-10-CM

## 2025-07-15 DIAGNOSIS — M25.561 RIGHT KNEE PAIN, UNSPECIFIED CHRONICITY: ICD-10-CM

## 2025-07-15 DIAGNOSIS — M35.3 PMR (POLYMYALGIA RHEUMATICA) (MULTI): ICD-10-CM

## 2025-07-15 RX ORDER — PREDNISONE 5 MG/1
TABLET ORAL
Qty: 180 TABLET | Refills: 0 | Status: SHIPPED | OUTPATIENT
Start: 2025-07-15

## 2025-07-15 RX ORDER — HYDROXYCHLOROQUINE SULFATE 200 MG/1
TABLET, FILM COATED ORAL
Qty: 180 TABLET | Refills: 3 | Status: SHIPPED | OUTPATIENT
Start: 2025-07-15

## 2025-07-15 RX ORDER — METHYLPREDNISOLONE ACETATE 80 MG/ML
40 INJECTION, SUSPENSION INTRA-ARTICULAR; INTRALESIONAL; INTRAMUSCULAR; SOFT TISSUE
Status: COMPLETED | OUTPATIENT
Start: 2025-07-15 | End: 2025-07-15

## 2025-07-15 RX ADMIN — METHYLPREDNISOLONE ACETATE 40 MG: 80 INJECTION, SUSPENSION INTRA-ARTICULAR; INTRALESIONAL; INTRAMUSCULAR; SOFT TISSUE at 16:14

## 2025-07-15 NOTE — PATIENT INSTRUCTIONS
Your heart rate sounds irregular; just let Dr ramirez know, and he may repeat an EKG    Since steroids help you, I am wondering if you have rheumatoid arthritis on top of the osteoarthritis    I would like to aspirate your knee today; and send the fluid for stuides    I would like you to start plaquenil 400 mg daily and prednisone 15 mg daily for 2 weeks, then 12.5 mg daily for 2 weeks, then 10 mg daily  until you see me in about 6 weeks     Over the counter , take calcium 500 mg a day (eat extra calcium in your diet) and Vitamin D3 2000 units daily    Ill refer you to ophthalmology for plaquenil screening    If the plaquenil is not enough, then we may need to add other meds\    Continue febuxostat and colchicine    Please followup with me in about 6 weeks    Please get labs today

## 2025-07-15 NOTE — PROGRESS NOTES
Subjective   Patient ID: Bipin Chatman is a 69 y.o. male who presents for Follow-up (Pain in right and both hips).  HPI:    Male with a history of gout not responding to allopurinol or colchicine, thrombosed left popliteal artery aneurysm, left REYNALDO aneurysm, hypertension, coronary artery disease, CABG, GERD, vitamin D deficiency, dyslipidemia, muscle aches, muscle fatigue, joint pain, here for fu    Per patient, he has had gout since 2019; it started with ankle pain; pain got worse ; At first, patient was put on allopurinol and colchicine. Patient states colchicine helps. Uloric works better than allopurinol for patient. Last flare was 2024; Flares every couple months; started uloric 2024    Once in  a while, he would get diarrhea with colchicine    Patient has joint pain every day  in his hips, knees; same all day. Activity makes the pain worse. Has AM stiffness for a half hour.  For patients joint pain, he was using tylenol two 325 mg daily as needed for pain; dexamethasone helps with all his pain, he get dex 5 tabs of 4 mg daily for flares     Dexamethasone helps with hip and knee pain 75%; he gets 4 day bursts from his PMD.     2025- patient is having pain in shoulders, hips, right knee; this has been going on since 3/2025; steroids help a lot; his right knee has been swollen for several weeks, and he has been having difficulty walking    Does not smoke, does not drink, 4-5 liquor drinks a week    Labs:  2019  WBC H to 12.4, Hgb L to 12.7, platelets H to 6-9    :  Cr normal, ALP/AST/ALT/albuin.protein nomrla  CK normal  Uric acid 6.3  ESR normal    Serologies:    ANA CRISTINA neg, RF positive  to 25,CCP positive to 8  HLAB27 neg, HLAb58 neg  ESR normal, CRP elevated to 1.23      Imagin XR hip bilateral:  Moderate osteoarthritis right hip.      Mild osteoarthritis left hip.     XR Knees:  Mild osteoarthritis right knee.      Unremarkable appearance left knee.      Bilateral vascular  "calcifications.    Rheumatology specific review of systems   joint pain, morning stiffness>30 min , fevers , chills, unintentional weight loss, rashes, alopecia, mouth sores, nasal ulcers, photosensitivity, Raynauds, morning stiffness in back, dry eyes, dry mouth, blood clots, sister has RA, uveitis, blood or mucus in stool         Objective   /80 (BP Location: Left arm, Patient Position: Sitting, BP Cuff Size: Adult)   Pulse 75   Ht 1.753 m (5' 9\")   Wt 94.4 kg (208 lb 3.2 oz)   SpO2 95%   BMI 30.75 kg/m²       Physical Exam  Constitutional: Alert and in no acute distress. Well developed, well nourished  Head and Face: Head and face: Normal.    Cardiovascular: Heart rate and rhythm were irregular.   Pulmonary: No respiratory distress. Clear bilateral breath sounds.  Musculoskeletal:  R knee effusion, warm, tender to palpation\  Pain with abduction of b/l shoulders       Lab Results   Component Value Date    WBC 9.4 01/23/2025    HGB 17.0 01/23/2025    HCT 50.5 01/23/2025     01/23/2025    ALT 48 01/23/2025    AST 28 01/23/2025    CREATININE 1.08 01/23/2025          Lab Results   Component Value Date    ANA CRISTINA Negative 04/10/2024    SEDRATE 6 01/23/2025    CRP 1.32 (H) 01/23/2025    RF 25 (H) 04/10/2024   \\            There is currently no information documented on the homunculus. Go to the Rheumatology activity and complete the homunculus joint exam.          Assessment/Plan:    #Gout  -Went over ACR diet, discussed about diet  -He has a tophi on left MTP; goal uric acid 5  -Continue febuxostat 80 mg daily; -1 year prescribed October 2024  -discussed side effects of febuxostat including but not limited to GI distress, CV risk, liver lab abnormalities; ACR handout given  -Conintue colchicine 1 tablet daily-1 year prescribed October 2024  --Risks of colchicine discussed including but not limited to diarrhea.  Colchicine should be avoided in chronic kidney disease.  Patient understanding and aware of " risks.  -Discussed that patient needs to stop colchicine if he has any weakness or muscle aches, given that he is also on a statin, and he needs to let me know-Ck and aldolase normal 2025 after starting colchicine  -If patient is still flaring every couple months by time of next visit, I may need to go up on the febuxostat  -Side effects of systemic steroids were discussed. These side effects come from the ACR patient hand out and are including but not limited to bruising, osteoporosis (or weakened bones), diabetes, infection, hypertension, weight gain, cataracts, glaucoma, and a bone disorder called avascular necrosis. We discussed that most side effects are related to the dose administered and duration of treatment, so the goal is to use it at the lowest effective dose for the shortest period of time necessary. Discussed that though prednisone rarely has a direct interaction with other medications, there is an increased risk of infection when combining prednisone with other immunosuppressive medications.  Additionally, discussed that when taking prednisone with NSAIDs , there is an increased risk of GI ulcers. Patient handout given. Patient understanding and accepting of risks.    #joint pain responsive to dexamethasone  #OA  #RF and CCP positive  #Right knee effusion  -Given that his joint pain is 75% responsive to dexamethasone burst per his PCP, he has a positive RF and CCP, and he has a right knee effusion as well as hamstring tightness and difficulty abducting shoulders, I am concerned the patient may have RA, PMR, or an overlap  - Aspirated 40 cc from right knee today, and sent for cell count, crystals, culture, AFB culture  - 40 mg Depo-Medrol injected  - Will also start treating for a prednisone/PMR overlap  - Start prednisone as follows: prednisone 15 mg  (3 tabs) daily for 2 weeks , then 12.5 mg (2.5 tabs) daily for 2 weeks, then 10 mg daily (2 tabs)  until you see me in about 6 weeks -enough pred rx for  this 7/2025  -Rec otc Daily calcium (or eat extra ca in diet) and vitamin D while on prednisone,  -Have let cardiologist know about prednisone prescription given his extensive cardiac history, will try to get him off as it is possible  -Will also start Plaquenil for 400 mg daily-1 year prescribed July 2025  -Risks of Plaquenil discussed including but not limited to GI side effects, changes in skin pigment, hair changes, muscle weakness, and retinopathy.  Patient counseled to get yearly retinal screening while on Plaquenil.  Patient understanding and aware of risks.  -ophthalmology referral placed 7/2025  --Avoid NSAIDs orally due to CAD, history of CABG, aneurysm  - Given that patient has several drinks of liquor a week, methotrexate may not be the best steroid sparing agent for him.  Depending on labs and picture, may consider an IL-6 inhibitor, versus Arava, if patient cannot come off of the prednisone    Large Joint Injection/Arthrocentesis: R knee on 7/15/2025 4:14 PM  Indications: pain and joint swelling  Details: 25 G needle, superolateral approach  Medications: 40 mg methylPREDNISolone acetate 80 mg/mL  Aspirate: 40 mL (serosanguineous); sent for lab analysis  Outcome: tolerated well, no immediate complications  Consent was given by the patient. Immediately prior to procedure a time out was called to verify the correct patient, procedure, equipment, support staff and site/side marked as required. Patient was prepped and draped in the usual sterile fashion.             Patient counseled to seek medical care if any new or worsening symptoms, urgently if needed.      Note will be sent to primary care doctor, and was discussed with cardiologist    Return to clinic in 6 wk, lab today    Total time on this day of visit includes record and documentation review before and after visit including documentation and time not explicitly included on EMR time stamp.     Dragon dictation software was used to dictate this  note. Errors may have occurred during dictation that was not intended by the user.

## 2025-07-16 DIAGNOSIS — I25.10 CORONARY ARTERY DISEASE INVOLVING NATIVE CORONARY ARTERY OF NATIVE HEART WITHOUT ANGINA PECTORIS: ICD-10-CM

## 2025-07-16 DIAGNOSIS — E78.2 MODERATE MIXED HYPERLIPIDEMIA NOT REQUIRING STATIN THERAPY: Primary | ICD-10-CM

## 2025-07-16 NOTE — TELEPHONE ENCOUNTER
Phoned patient and no answer.  Left voicemail with contact number for cardiology nursing office ([phone]) and requested patient return call.

## 2025-07-17 LAB
ALBUMIN SERPL-MCNC: 4.1 G/DL (ref 3.6–5.1)
ALP SERPL-CCNC: 76 U/L (ref 35–144)
ALT SERPL-CCNC: 59 U/L (ref 9–46)
ANION GAP SERPL CALCULATED.4IONS-SCNC: 9 MMOL/L (CALC) (ref 7–17)
AST SERPL-CCNC: 38 U/L (ref 10–35)
BASOPHILS # BLD AUTO: 74 CELLS/UL (ref 0–200)
BASOPHILS NFR BLD AUTO: 0.7 %
BILIRUB SERPL-MCNC: 1.4 MG/DL (ref 0.2–1.2)
BUN SERPL-MCNC: 25 MG/DL (ref 7–25)
CALCIUM SERPL-MCNC: 9.2 MG/DL (ref 8.6–10.3)
CHLORIDE SERPL-SCNC: 106 MMOL/L (ref 98–110)
CO2 SERPL-SCNC: 27 MMOL/L (ref 20–32)
CREAT SERPL-MCNC: 1.15 MG/DL (ref 0.7–1.35)
CRP SERPL-MCNC: 16.9 MG/L
EGFRCR SERPLBLD CKD-EPI 2021: 69 ML/MIN/1.73M2
EOSINOPHIL # BLD AUTO: 42 CELLS/UL (ref 15–500)
EOSINOPHIL NFR BLD AUTO: 0.4 %
ERYTHROCYTE [DISTWIDTH] IN BLOOD BY AUTOMATED COUNT: 14 % (ref 11–15)
ERYTHROCYTE [SEDIMENTATION RATE] IN BLOOD BY WESTERGREN METHOD: 9 MM/H
GLUCOSE SERPL-MCNC: 112 MG/DL (ref 65–99)
HBV CORE AB SERPL QL IA: NORMAL
HBV SURFACE AG SERPL QL IA: NORMAL
HCT VFR BLD AUTO: 48.6 % (ref 38.5–50)
HCV AB SERPL QL IA: NORMAL
HGB BLD-MCNC: 16.2 G/DL (ref 13.2–17.1)
IGNF NEG CNTRL BLD: NORMAL
LYMPHOCYTES # BLD AUTO: 1961 CELLS/UL (ref 850–3900)
LYMPHOCYTES NFR BLD AUTO: 18.5 %
M TB IFN-G BLD-IMP: NEGATIVE
MCH RBC QN AUTO: 30.6 PG (ref 27–33)
MCHC RBC AUTO-ENTMCNC: 33.3 G/DL (ref 32–36)
MCV RBC AUTO: 91.7 FL (ref 80–100)
MITOGEN IGNF.SPOT COUNT BLD: NORMAL
MONOCYTES # BLD AUTO: 625 CELLS/UL (ref 200–950)
MONOCYTES NFR BLD AUTO: 5.9 %
NEUTROPHILS # BLD AUTO: 7897 CELLS/UL (ref 1500–7800)
NEUTROPHILS NFR BLD AUTO: 74.5 %
PLATELET # BLD AUTO: 275 THOUSAND/UL (ref 140–400)
PMV BLD REES-ECKER: 9.4 FL (ref 7.5–12.5)
POTASSIUM SERPL-SCNC: 3.1 MMOL/L (ref 3.5–5.3)
PROT SERPL-MCNC: 6.3 G/DL (ref 6.1–8.1)
QUEST PANEL A SPOT COUNT: 0
QUEST PANEL B SPOT COUNT: 0
RBC # BLD AUTO: 5.3 MILLION/UL (ref 4.2–5.8)
SODIUM SERPL-SCNC: 142 MMOL/L (ref 135–146)
URATE SERPL-MCNC: 5.3 MG/DL (ref 4–8)
WBC # BLD AUTO: 10.6 THOUSAND/UL (ref 3.8–10.8)

## 2025-07-17 NOTE — TELEPHONE ENCOUNTER
Pt called back. He was given the message from Dr. Niño about coming in for an EKG. He will be in tomorrow for this.

## 2025-07-18 DIAGNOSIS — I25.10 CORONARY ARTERY DISEASE INVOLVING NATIVE CORONARY ARTERY OF NATIVE HEART WITHOUT ANGINA PECTORIS: ICD-10-CM

## 2025-07-18 DIAGNOSIS — E78.2 MODERATE MIXED HYPERLIPIDEMIA NOT REQUIRING STATIN THERAPY: ICD-10-CM

## 2025-07-18 LAB
ALBUMIN SERPL-MCNC: NORMAL G/DL
ALP SERPL-CCNC: NORMAL U/L
ALT SERPL-CCNC: NORMAL U/L
ANION GAP SERPL CALCULATED.4IONS-SCNC: NORMAL MMOL/L
AST SERPL-CCNC: NORMAL U/L
BASOPHILS # BLD AUTO: NORMAL 10*3/UL
BASOPHILS NFR BLD AUTO: NORMAL %
BILIRUB SERPL-MCNC: NORMAL MG/DL
BLASTS # BLD: NORMAL 10*3/UL
BLASTS NFR BLD MANUAL: NORMAL %
BUN SERPL-MCNC: NORMAL MG/DL
CALCIUM SERPL-MCNC: NORMAL MG/DL
CHLORIDE SERPL-SCNC: NORMAL MMOL/L
CO2 SERPL-SCNC: NORMAL MMOL/L
CREAT SERPL-MCNC: NORMAL MG/DL
CRP SERPL-MCNC: NORMAL MG/L
EGFRCR SERPLBLD CKD-EPI 2021: NORMAL ML/MIN/{1.73_M2}
EOSINOPHIL # BLD AUTO: NORMAL 10*3/UL
EOSINOPHIL NFR BLD AUTO: NORMAL %
ERYTHROCYTE [DISTWIDTH] IN BLOOD BY AUTOMATED COUNT: NORMAL %
ERYTHROCYTE [SEDIMENTATION RATE] IN BLOOD BY WESTERGREN METHOD: NORMAL MM/H
GLUCOSE SERPL-MCNC: NORMAL MG/DL
HBV CORE AB SERPL QL IA: NORMAL
HBV SURFACE AG SERPL QL IA: NORMAL
HBV SURFACE AG SERPL QL NT: NORMAL
HCT VFR BLD AUTO: NORMAL %
HCV AB SERPL QL IA: NORMAL
HGB BLD-MCNC: NORMAL G/DL
IGNF NEG CNTRL BLD: NORMAL
LYMPHOCYTES # BLD AUTO: NORMAL 10*3/UL
LYMPHOCYTES NFR BLD AUTO: NORMAL %
M TB IFN-G BLD-IMP: NORMAL
MCH RBC QN AUTO: NORMAL PG
MCHC RBC AUTO-ENTMCNC: NORMAL G/DL
MCV RBC AUTO: NORMAL FL
METAMYELOCYTES # BLD: NORMAL 10*3/UL
METAMYELOCYTES NFR BLD MANUAL: NORMAL %
MITOGEN IGNF.SPOT COUNT BLD: NORMAL
MONOCYTES # BLD AUTO: NORMAL 10*3/UL
MONOCYTES NFR BLD AUTO: NORMAL %
MYELOCYTES # BLD: NORMAL 10*3/UL
MYELOCYTES NFR BLD MANUAL: NORMAL %
NEUTROPHILS # BLD AUTO: NORMAL 10*3/UL
NEUTROPHILS NFR BLD AUTO: NORMAL %
NEUTS BAND # BLD: NORMAL 10*3/UL
NEUTS BAND NFR BLD MANUAL: NORMAL %
NRBC # BLD: NORMAL 10*3/UL
NRBC BLD-RTO: NORMAL /100{WBCS}
PLATELET # BLD AUTO: NORMAL 10*3/UL
PMV BLD REES-ECKER: NORMAL FL
POTASSIUM SERPL-SCNC: NORMAL MMOL/L
PROMYELOCYTES # BLD: NORMAL 10*3/UL
PROMYELOCYTES NFR BLD MANUAL: NORMAL %
PROT SERPL-MCNC: NORMAL G/DL
QUEST PANEL A SPOT COUNT: NORMAL
QUEST PANEL B SPOT COUNT: NORMAL
RBC # BLD AUTO: NORMAL 10*6/UL
SERVICE CMNT-IMP: NORMAL
SODIUM SERPL-SCNC: NORMAL MMOL/L
URATE SERPL-MCNC: NORMAL MG/DL
VARIANT LYMPHS NFR BLD: NORMAL %
WBC # BLD AUTO: NORMAL 10*3/UL

## 2025-07-18 RX ORDER — EVOLOCUMAB 140 MG/ML
140 INJECTION, SOLUTION SUBCUTANEOUS
Qty: 6 ML | Refills: 3 | Status: SHIPPED | OUTPATIENT
Start: 2025-07-18 | End: 2026-07-18

## 2025-07-20 LAB
APPEARANCE FLD: NORMAL
BACTERIA SPEC AEROBE CULT: NORMAL
BACTERIA SPEC ANAEROBE CULT: NORMAL
BASOPHILS NFR FLD MANUAL: 0 %
BODY FLD TYPE: NORMAL
COLOR FLD: YELLOW
CRYSTALS FLD MICRO: NORMAL
EOSINOPHIL NFR FLD MANUAL: 0 %
LYMPHOCYTES NFR FLD MANUAL: 53 %
MESOTHL CELL NFR FLD MANUAL: 0 %
MONOS+MACROS NFR FLD MANUAL: 14 %
NEUTROPHILS NFR FLD MANUAL: 33 %
QUEST ACID FAST STAIN: NORMAL
QUEST AFB CULTURE: NORMAL
QUEST AFB REPORT: NORMAL
QUEST AFB SPECIAL REQUEST: NORMAL
QUEST AFB SPECIMEN DISCRIPTION: NORMAL
SPECIMEN SOURCE: NORMAL
WBC # FLD MANUAL: 2453 CELLS/UL

## 2025-07-21 ENCOUNTER — HOSPITAL ENCOUNTER (OUTPATIENT)
Dept: CARDIOLOGY | Facility: CLINIC | Age: 70
Discharge: HOME | End: 2025-07-21
Payer: COMMERCIAL

## 2025-07-21 PROCEDURE — 93005 ELECTROCARDIOGRAM TRACING: CPT

## 2025-07-21 PROCEDURE — 93010 ELECTROCARDIOGRAM REPORT: CPT | Performed by: INTERNAL MEDICINE

## 2025-07-21 NOTE — ASSESSMENT & PLAN NOTE
uncontrolled with last clinic  mg/dL and LDL unavailable due to TG >400 mg/dL on 7/15/2025. Statin therapy discontinued after last cardiology visit due to statin related muscle adverse effects. Today we discussed initiation of evolocumab in the settings of hyperlipidemia and CAD. Injections will be completed by his spouse.     LFTs are elevated above the normal limit   Lab Results   Component Value Date    ALT 59 (H) 07/15/2025    AST 38 (H) 07/15/2025    ALKPHOS 76 07/15/2025    BILITOT 1.4 (H) 07/15/2025      Medication Changes:  CONTINUE  Ezetimibe 10 mg  STOP  Atorvastatin 40 mg once daily - RADHA  START  Repatha sureclick 140 mg every  14 days    Education and Monitoring:  Repatha Monitoring and Education:  Side effects to expect with Repatha include injection site pain/swelling/redness, cold-like symptoms, headache, and rash   Monitor cholesterol levels before treatment, 4 to 12 weeks after the start of treatment, and then every 3 to 12 months thereafter.   Administration:  Repatha must be kept refrigerated, if necessary a pen may be kept at room temperature for up to 30 days.  Remove the Pen from the refrigerator. Leave the base cap on until you are ready to inject.  Check the Pen label to make sure you have the right medicine and it has not . Additionally, ensure that the solution is not cloudy, discolored, or has particles in it.  Prior to administration, wash and rinse hands.   Next, choose your injection site (You may inject into your abdomen, thigh; Another person may give you the injection in your upper arm)  Change (rotate) your injection site with each dose. You may use the same area of your body, but be sure to choose a different injection site in that area.  Once administration site has been selected, use a new alcohol swab to sanitize the administration site and allow to air dry.  Pull off the orange cap only when you are ready to inject. The orange cap can not be left off for more than  5 minutes as this can cause the medication to dry out.   Stretch or pinch your injection site to create a firm surface and place the yellow safety guard on your skin at a 90 degree angle.  Firmly push down the autoinjector onto your skin until it stops moving. When you are ready to inject press the gray start button. You will hear 2 clicks, one at the start of the injection and another once the injection is complete. Continue pushing down on your skin for about 15 seconds. The window will turn yellow when the injection is complete.   Dispose of the pen in a sharps container (Coffee can, laundry detergent bottle)  Injections will should be done on the same day every other week, if you forget you have up to 7 days to remember to do your next dose. If less than 7 days remain before the next scheduled dose, skip the missed dose and administer the next dose on the regularly scheduled day.   Goal LDL: <55 mg/dL  Focus on implementing dietary changes as discussed.  Recheck lipid panel in 4-12 weeks.

## 2025-07-23 LAB
ATRIAL RATE: 63 BPM
P AXIS: 25 DEGREES
P OFFSET: 196 MS
P ONSET: 139 MS
PR INTERVAL: 174 MS
Q ONSET: 226 MS
QRS COUNT: 10 BEATS
QRS DURATION: 92 MS
QT INTERVAL: 392 MS
QTC CALCULATION(BAZETT): 401 MS
QTC FREDERICIA: 398 MS
R AXIS: 9 DEGREES
T AXIS: 136 DEGREES
T OFFSET: 422 MS
VENTRICULAR RATE: 63 BPM

## 2025-08-19 ENCOUNTER — APPOINTMENT (OUTPATIENT)
Dept: OPHTHALMOLOGY | Facility: CLINIC | Age: 70
End: 2025-08-19
Payer: COMMERCIAL

## 2025-08-19 DIAGNOSIS — Z79.899 LONG-TERM USE OF PLAQUENIL: Primary | ICD-10-CM

## 2025-08-19 DIAGNOSIS — H52.4 HYPEROPIA OF BOTH EYES WITH REGULAR ASTIGMATISM AND PRESBYOPIA: ICD-10-CM

## 2025-08-19 DIAGNOSIS — H52.223 HYPEROPIA OF BOTH EYES WITH REGULAR ASTIGMATISM AND PRESBYOPIA: ICD-10-CM

## 2025-08-19 DIAGNOSIS — H52.03 HYPEROPIA OF BOTH EYES WITH REGULAR ASTIGMATISM AND PRESBYOPIA: ICD-10-CM

## 2025-08-19 DIAGNOSIS — H35.89 OTHER SPECIFIED RETINAL DISORDERS: ICD-10-CM

## 2025-08-19 PROCEDURE — 92134 CPTRZ OPH DX IMG PST SGM RTA: CPT

## 2025-08-19 PROCEDURE — 92015 DETERMINE REFRACTIVE STATE: CPT

## 2025-08-19 PROCEDURE — 92083 EXTENDED VISUAL FIELD XM: CPT

## 2025-08-19 PROCEDURE — 92004 COMPRE OPH EXAM NEW PT 1/>: CPT

## 2025-08-19 RX ORDER — FLUOROURACIL 50 MG/G
CREAM TOPICAL
COMMUNITY
Start: 2025-07-07

## 2025-08-19 ASSESSMENT — CONF VISUAL FIELD
OD_NORMAL: 1
OS_INFERIOR_TEMPORAL_RESTRICTION: 0
OD_INFERIOR_NASAL_RESTRICTION: 0
OS_SUPERIOR_TEMPORAL_RESTRICTION: 0
OS_INFERIOR_NASAL_RESTRICTION: 0
OS_SUPERIOR_NASAL_RESTRICTION: 0
OD_SUPERIOR_NASAL_RESTRICTION: 0
OD_SUPERIOR_TEMPORAL_RESTRICTION: 0
OD_INFERIOR_TEMPORAL_RESTRICTION: 0
OS_NORMAL: 1

## 2025-08-19 ASSESSMENT — KERATOMETRY
OS_K1POWER_DIOPTERS: 42.75
METHOD_AUTO_MANUAL: AUTOMATED
OD_K1POWER_DIOPTERS: 42.75
OD_K2POWER_DIOPTERS: 43.25
OS_AXISANGLE_DEGREES: 78
OS_K2POWER_DIOPTERS: 43.00
OD_AXISANGLE2_DEGREES: 70
OD_AXISANGLE_DEGREES: 180
OS_AXISANGLE2_DEGREES: 168

## 2025-08-19 ASSESSMENT — VISUAL ACUITY
OS_SC+: -1
OS_PH_SC+: -2
OD_SC+: -1
OS_PH_SC: 20/20
OD_SC: 20/25
METHOD: SNELLEN - SINGLE
OS_SC: 20/40

## 2025-08-19 ASSESSMENT — CUP TO DISC RATIO
OD_RATIO: 0.35
OS_RATIO: 0.30

## 2025-08-19 ASSESSMENT — PAIN SCALES - GENERAL: PAINLEVEL_OUTOF10: 0-NO PAIN

## 2025-08-19 ASSESSMENT — REFRACTION_MANIFEST
OD_SPHERE: +3.00
OS_CYLINDER: -0.50
OS_CYLINDER: -0.50
OS_ADD: +2.75
OD_AXIS: 090
OD_SPHERE: +1.75
OS_AXIS: 078
OD_ADD: +2.75
OD_CYLINDER: -0.75
OS_SPHERE: +1.25
METHOD_AUTOREFRACTION: 1
OS_AXIS: 080
OS_SPHERE: +2.00
OD_AXIS: 087
OD_CYLINDER: -1.00

## 2025-08-19 ASSESSMENT — REFRACTION
OD_AXIS: 090
OS_SPHERE: +2.25
OD_SPHERE: +2.75
OS_CYLINDER: -0.50
OS_AXIS: 080
OD_CYLINDER: -0.75

## 2025-08-19 ASSESSMENT — ENCOUNTER SYMPTOMS
ALLERGIC/IMMUNOLOGIC NEGATIVE: 0
HEMATOLOGIC/LYMPHATIC NEGATIVE: 0
NEUROLOGICAL NEGATIVE: 0
RESPIRATORY NEGATIVE: 0
GASTROINTESTINAL NEGATIVE: 0
MUSCULOSKELETAL NEGATIVE: 0
PSYCHIATRIC NEGATIVE: 0
ENDOCRINE NEGATIVE: 0
CARDIOVASCULAR NEGATIVE: 0
CONSTITUTIONAL NEGATIVE: 0
EYES NEGATIVE: 1

## 2025-08-19 ASSESSMENT — SLIT LAMP EXAM - LIDS
COMMENTS: NORMAL
COMMENTS: NORMAL

## 2025-08-19 ASSESSMENT — PATIENT HEALTH QUESTIONNAIRE - PHQ9
2. FEELING DOWN, DEPRESSED OR HOPELESS: NOT AT ALL
SUM OF ALL RESPONSES TO PHQ9 QUESTIONS 1 AND 2: 0
1. LITTLE INTEREST OR PLEASURE IN DOING THINGS: NOT AT ALL

## 2025-08-19 ASSESSMENT — EXTERNAL EXAM - RIGHT EYE: OD_EXAM: NORMAL

## 2025-08-19 ASSESSMENT — TONOMETRY
OS_IOP_MMHG: 16
OD_IOP_MMHG: 15
IOP_METHOD: GOLDMANN APPLANATION

## 2025-08-19 ASSESSMENT — EXTERNAL EXAM - LEFT EYE: OS_EXAM: NORMAL

## 2025-08-21 ENCOUNTER — APPOINTMENT (OUTPATIENT)
Dept: PHARMACY | Facility: HOSPITAL | Age: 70
End: 2025-08-21
Payer: COMMERCIAL

## 2025-08-25 DIAGNOSIS — N40.0 BENIGN PROSTATIC HYPERPLASIA WITHOUT LOWER URINARY TRACT SYMPTOMS: ICD-10-CM

## 2025-08-25 DIAGNOSIS — E88.810 METABOLIC SYNDROME: ICD-10-CM

## 2025-08-25 RX ORDER — TAMSULOSIN HYDROCHLORIDE 0.4 MG/1
0.4 CAPSULE ORAL DAILY
Qty: 100 CAPSULE | Refills: 2 | Status: SHIPPED | OUTPATIENT
Start: 2025-08-25

## 2025-08-28 ENCOUNTER — APPOINTMENT (OUTPATIENT)
Dept: RHEUMATOLOGY | Facility: CLINIC | Age: 70
End: 2025-08-28
Payer: COMMERCIAL

## 2025-08-28 VITALS
DIASTOLIC BLOOD PRESSURE: 94 MMHG | BODY MASS INDEX: 31.04 KG/M2 | SYSTOLIC BLOOD PRESSURE: 144 MMHG | WEIGHT: 209.6 LBS | OXYGEN SATURATION: 96 % | HEIGHT: 69 IN | HEART RATE: 65 BPM

## 2025-08-28 DIAGNOSIS — M25.561 ARTHRALGIA OF BOTH KNEES: ICD-10-CM

## 2025-08-28 DIAGNOSIS — M25.562 ARTHRALGIA OF BOTH KNEES: ICD-10-CM

## 2025-08-28 DIAGNOSIS — M10.072 ACUTE IDIOPATHIC GOUT OF LEFT FOOT: ICD-10-CM

## 2025-08-28 DIAGNOSIS — M10.9 GOUT, UNSPECIFIED CAUSE, UNSPECIFIED CHRONICITY, UNSPECIFIED SITE: ICD-10-CM

## 2025-08-28 DIAGNOSIS — M10.9 ACUTE GOUT OF ANKLE, UNSPECIFIED CAUSE, UNSPECIFIED LATERALITY: ICD-10-CM

## 2025-08-28 DIAGNOSIS — M17.0 PRIMARY OSTEOARTHRITIS OF BOTH KNEES: ICD-10-CM

## 2025-08-28 DIAGNOSIS — M19.90 OSTEOARTHRITIS, UNSPECIFIED OSTEOARTHRITIS TYPE, UNSPECIFIED SITE: ICD-10-CM

## 2025-08-28 DIAGNOSIS — M16.0 PRIMARY OSTEOARTHRITIS OF BOTH HIPS: ICD-10-CM

## 2025-08-28 DIAGNOSIS — M05.79 RHEUMATOID ARTHRITIS INVOLVING MULTIPLE SITES WITH POSITIVE RHEUMATOID FACTOR (MULTI): ICD-10-CM

## 2025-08-28 DIAGNOSIS — M79.10 MUSCLE PAIN: ICD-10-CM

## 2025-08-28 DIAGNOSIS — M35.3 PMR (POLYMYALGIA RHEUMATICA) (MULTI): ICD-10-CM

## 2025-08-28 PROCEDURE — 3008F BODY MASS INDEX DOCD: CPT | Performed by: STUDENT IN AN ORGANIZED HEALTH CARE EDUCATION/TRAINING PROGRAM

## 2025-08-28 PROCEDURE — 3080F DIAST BP >= 90 MM HG: CPT | Performed by: STUDENT IN AN ORGANIZED HEALTH CARE EDUCATION/TRAINING PROGRAM

## 2025-08-28 PROCEDURE — 3077F SYST BP >= 140 MM HG: CPT | Performed by: STUDENT IN AN ORGANIZED HEALTH CARE EDUCATION/TRAINING PROGRAM

## 2025-08-28 PROCEDURE — 99215 OFFICE O/P EST HI 40 MIN: CPT | Performed by: STUDENT IN AN ORGANIZED HEALTH CARE EDUCATION/TRAINING PROGRAM

## 2025-08-28 PROCEDURE — 1159F MED LIST DOCD IN RCRD: CPT | Performed by: STUDENT IN AN ORGANIZED HEALTH CARE EDUCATION/TRAINING PROGRAM

## 2025-08-28 RX ORDER — FEBUXOSTAT 80 MG/1
80 TABLET, FILM COATED ORAL DAILY
Qty: 90 TABLET | Refills: 3 | Status: SHIPPED | OUTPATIENT
Start: 2025-08-28

## 2025-08-28 RX ORDER — COLCHICINE 0.6 MG/1
0.6 TABLET ORAL DAILY
Qty: 90 TABLET | Refills: 3 | Status: SHIPPED | OUTPATIENT
Start: 2025-08-28

## 2025-08-28 RX ORDER — PREDNISONE 5 MG/1
TABLET ORAL
Qty: 124 TABLET | Refills: 0 | Status: SHIPPED | OUTPATIENT
Start: 2025-08-28

## 2025-08-28 RX ORDER — PREDNISONE 1 MG/1
TABLET ORAL
Qty: 310 TABLET | Refills: 0 | Status: SHIPPED | OUTPATIENT
Start: 2025-08-28

## 2025-09-02 ENCOUNTER — APPOINTMENT (OUTPATIENT)
Dept: PHARMACY | Facility: HOSPITAL | Age: 70
End: 2025-09-02
Payer: COMMERCIAL

## 2025-09-05 ENCOUNTER — APPOINTMENT (OUTPATIENT)
Dept: RADIOLOGY | Facility: HOSPITAL | Age: 70
End: 2025-09-05
Payer: COMMERCIAL

## 2025-09-05 ENCOUNTER — HOSPITAL ENCOUNTER (EMERGENCY)
Facility: HOSPITAL | Age: 70
Discharge: HOME | End: 2025-09-05
Attending: EMERGENCY MEDICINE
Payer: COMMERCIAL

## 2025-09-05 VITALS
WEIGHT: 203 LBS | TEMPERATURE: 97.7 F | DIASTOLIC BLOOD PRESSURE: 88 MMHG | BODY MASS INDEX: 30.07 KG/M2 | HEART RATE: 74 BPM | OXYGEN SATURATION: 96 % | SYSTOLIC BLOOD PRESSURE: 146 MMHG | RESPIRATION RATE: 18 BRPM | HEIGHT: 69 IN

## 2025-09-05 DIAGNOSIS — M25.562 ACUTE PAIN OF LEFT KNEE: Primary | ICD-10-CM

## 2025-09-05 PROCEDURE — 73700 CT LOWER EXTREMITY W/O DYE: CPT | Mod: LT

## 2025-09-05 PROCEDURE — 99284 EMERGENCY DEPT VISIT MOD MDM: CPT | Performed by: EMERGENCY MEDICINE

## 2025-09-05 PROCEDURE — 90471 IMMUNIZATION ADMIN: CPT

## 2025-09-05 PROCEDURE — 90715 TDAP VACCINE 7 YRS/> IM: CPT

## 2025-09-05 PROCEDURE — 2500000004 HC RX 250 GENERAL PHARMACY W/ HCPCS (ALT 636 FOR OP/ED)

## 2025-09-05 PROCEDURE — 73564 X-RAY EXAM KNEE 4 OR MORE: CPT | Mod: LT

## 2025-09-05 PROCEDURE — 2500000001 HC RX 250 WO HCPCS SELF ADMINISTERED DRUGS (ALT 637 FOR MEDICARE OP)

## 2025-09-05 RX ORDER — ACETAMINOPHEN 500 MG
1000 TABLET ORAL EVERY 8 HOURS PRN
Qty: 18 TABLET | Refills: 0 | Status: SHIPPED | OUTPATIENT
Start: 2025-09-05 | End: 2025-09-08

## 2025-09-05 RX ORDER — PREDNISONE 20 MG/1
40 TABLET ORAL DAILY
Qty: 8 TABLET | Refills: 0 | Status: SHIPPED | OUTPATIENT
Start: 2025-09-05 | End: 2025-09-09

## 2025-09-05 RX ORDER — PREDNISONE 20 MG/1
40 TABLET ORAL ONCE
Status: COMPLETED | OUTPATIENT
Start: 2025-09-05 | End: 2025-09-05

## 2025-09-05 RX ORDER — ACETAMINOPHEN 325 MG/1
975 TABLET ORAL ONCE
Status: COMPLETED | OUTPATIENT
Start: 2025-09-05 | End: 2025-09-05

## 2025-09-05 RX ADMIN — TETANUS TOXOID, REDUCED DIPHTHERIA TOXOID AND ACELLULAR PERTUSSIS VACCINE, ADSORBED 0.5 ML: 5; 2.5; 8; 8; 2.5 SUSPENSION INTRAMUSCULAR at 14:51

## 2025-09-05 RX ADMIN — ACETAMINOPHEN 975 MG: 325 TABLET ORAL at 13:34

## 2025-09-05 RX ADMIN — PREDNISONE 40 MG: 20 TABLET ORAL at 14:52

## 2025-09-05 ASSESSMENT — PAIN DESCRIPTION - DESCRIPTORS
DESCRIPTORS: ACHING
DESCRIPTORS: ACHING

## 2025-09-05 ASSESSMENT — PAIN DESCRIPTION - FREQUENCY: FREQUENCY: CONSTANT/CONTINUOUS

## 2025-09-05 ASSESSMENT — PAIN SCALES - GENERAL: PAINLEVEL_OUTOF10: 8

## 2025-09-05 ASSESSMENT — PAIN DESCRIPTION - LOCATION: LOCATION: KNEE

## 2025-09-05 ASSESSMENT — PAIN - FUNCTIONAL ASSESSMENT: PAIN_FUNCTIONAL_ASSESSMENT: 0-10

## 2025-09-05 ASSESSMENT — PAIN DESCRIPTION - ONSET: ONSET: GRADUAL

## 2025-09-05 ASSESSMENT — PAIN DESCRIPTION - PROGRESSION: CLINICAL_PROGRESSION: NOT CHANGED

## 2025-09-05 ASSESSMENT — PAIN DESCRIPTION - PAIN TYPE: TYPE: ACUTE PAIN

## 2025-09-05 ASSESSMENT — PAIN DESCRIPTION - ORIENTATION: ORIENTATION: LEFT

## 2025-10-21 ENCOUNTER — APPOINTMENT (OUTPATIENT)
Dept: PHARMACY | Facility: HOSPITAL | Age: 70
End: 2025-10-21
Payer: COMMERCIAL

## 2025-12-02 ENCOUNTER — APPOINTMENT (OUTPATIENT)
Dept: RHEUMATOLOGY | Facility: CLINIC | Age: 70
End: 2025-12-02
Payer: COMMERCIAL

## 2026-02-12 ENCOUNTER — APPOINTMENT (OUTPATIENT)
Dept: PRIMARY CARE | Facility: CLINIC | Age: 71
End: 2026-02-12
Payer: COMMERCIAL

## 2026-08-25 ENCOUNTER — APPOINTMENT (OUTPATIENT)
Dept: OPHTHALMOLOGY | Facility: CLINIC | Age: 71
End: 2026-08-25
Payer: COMMERCIAL